# Patient Record
Sex: FEMALE | Race: OTHER | URBAN - METROPOLITAN AREA
[De-identification: names, ages, dates, MRNs, and addresses within clinical notes are randomized per-mention and may not be internally consistent; named-entity substitution may affect disease eponyms.]

---

## 2022-09-19 ENCOUNTER — HOSPITAL ENCOUNTER (EMERGENCY)
Age: 47
Discharge: HOME | End: 2022-09-19

## 2022-09-19 VITALS
HEART RATE: 104 BPM | TEMPERATURE: 98.7 F | DIASTOLIC BLOOD PRESSURE: 96 MMHG | RESPIRATION RATE: 19 BRPM | OXYGEN SATURATION: 99 % | BODY MASS INDEX: 29.23 KG/M2 | SYSTOLIC BLOOD PRESSURE: 160 MMHG | HEIGHT: 63 IN | WEIGHT: 165 LBS

## 2022-09-19 DIAGNOSIS — Z23 NEED FOR TETANUS BOOSTER: ICD-10-CM

## 2022-09-19 DIAGNOSIS — S61.411A LACERATION OF RIGHT HAND WITHOUT FOREIGN BODY, INITIAL ENCOUNTER: Primary | ICD-10-CM

## 2022-09-19 PROCEDURE — 90714 TD VACC NO PRESV 7 YRS+ IM: CPT | Performed by: PHYSICIAN ASSISTANT

## 2022-09-19 PROCEDURE — 99284 EMERGENCY DEPT VISIT MOD MDM: CPT

## 2022-09-19 PROCEDURE — 90471 IMMUNIZATION ADMIN: CPT | Performed by: PHYSICIAN ASSISTANT

## 2022-09-19 PROCEDURE — 6360000002 HC RX W HCPCS: Performed by: PHYSICIAN ASSISTANT

## 2022-09-19 PROCEDURE — 6370000000 HC RX 637 (ALT 250 FOR IP): Performed by: PHYSICIAN ASSISTANT

## 2022-09-19 PROCEDURE — 2500000003 HC RX 250 WO HCPCS: Performed by: PHYSICIAN ASSISTANT

## 2022-09-19 RX ORDER — NAPROXEN 500 MG/1
500 TABLET ORAL 2 TIMES DAILY
Qty: 14 TABLET | Refills: 0 | Status: SHIPPED | OUTPATIENT
Start: 2022-09-19 | End: 2022-09-26

## 2022-09-19 RX ORDER — CEPHALEXIN 500 MG/1
500 CAPSULE ORAL 4 TIMES DAILY
Qty: 28 CAPSULE | Refills: 0 | Status: SHIPPED | OUTPATIENT
Start: 2022-09-19 | End: 2022-10-07 | Stop reason: SDUPTHER

## 2022-09-19 RX ORDER — HYDROCODONE BITARTRATE AND ACETAMINOPHEN 5; 325 MG/1; MG/1
1 TABLET ORAL ONCE
Status: COMPLETED | OUTPATIENT
Start: 2022-09-19 | End: 2022-09-19

## 2022-09-19 RX ORDER — TETANUS AND DIPHTHERIA TOXOIDS ADSORBED 2; 2 [LF]/.5ML; [LF]/.5ML
0.5 INJECTION INTRAMUSCULAR ONCE
Status: COMPLETED | OUTPATIENT
Start: 2022-09-19 | End: 2022-09-19

## 2022-09-19 RX ORDER — HYDROCODONE BITARTRATE AND ACETAMINOPHEN 5; 325 MG/1; MG/1
1 TABLET ORAL EVERY 6 HOURS PRN
Qty: 12 TABLET | Refills: 0 | Status: SHIPPED | OUTPATIENT
Start: 2022-09-19 | End: 2022-09-22 | Stop reason: SDUPTHER

## 2022-09-19 RX ORDER — LIDOCAINE HYDROCHLORIDE 10 MG/ML
20 INJECTION, SOLUTION INFILTRATION; PERINEURAL ONCE
Status: COMPLETED | OUTPATIENT
Start: 2022-09-19 | End: 2022-09-19

## 2022-09-19 RX ADMIN — HYDROCODONE BITARTRATE AND ACETAMINOPHEN 1 TABLET: 5; 325 TABLET ORAL at 21:41

## 2022-09-19 RX ADMIN — TETANUS AND DIPHTHERIA TOXOIDS ADSORBED 0.5 ML: 2; 2 INJECTION INTRAMUSCULAR at 20:41

## 2022-09-19 RX ADMIN — LIDOCAINE HYDROCHLORIDE 20 ML: 10 INJECTION, SOLUTION INFILTRATION; PERINEURAL at 20:42

## 2022-09-19 ASSESSMENT — PAIN - FUNCTIONAL ASSESSMENT: PAIN_FUNCTIONAL_ASSESSMENT: 0-10

## 2022-09-19 ASSESSMENT — PAIN SCALES - GENERAL: PAINLEVEL_OUTOF10: 10

## 2022-09-20 NOTE — DISCHARGE INSTRUCTIONS
Rest hand  Ice is needed for swelling  Elevation for pain and swelling  Apply pressure if there is bleeding     Please keep wound covered with sterile dressing while at work or in public. Use Bacitracin or antibiotic ointment for only 2 days after the initial injury, otherwise, keep wound clean and dry. Don't soak wound in water. Do not use Peroxide or Alcohol to clean wound. Just use warm soap and water   Return to ED for wound check if wound becomes more painful or begins to drain yellow, thick discharge. Return with any increase in pain or swelling, inability to move extremity, or development of red streaking or fever.

## 2022-09-20 NOTE — ED PROVIDER NOTES
[facility]     Department of Emergency Medicine   ED  Provider Note  Admit Date/RoomTime: 9/19/2022  8:19 PM  ED Room: [facility]     Chief Complaint   Laceration (Right hand laceration after a fall)    History of Present Illness      Doerne Lazar is a 42 y.o. old female presenting to the emergency department for a laceration to the right hand. Patient states she was cooking and slipped on a wet floor patient states she hit her hand off of \"metal.\"  She is unsure of her last tetanus shot and will be updated at today's visit. Patient has a large C-shaped laceration to right palm with mild active bleeding. The bleeding is controlled with elevation and pressure. She has full range of motion of all fingers and wrist of affected extremity. She denies hitting her head or having any loss of consciousness. Patient denies any other complaint or concern at this ED visit. She does not take anticoagulation. She is alert and oriented x3 and in no apparent distress at this exam.  Patient is nontoxic-appearing. The patients tetanus status is unknown. ROS   Pertinent positives and negatives are stated within HPI, all other systems reviewed and are negative. Past Medical History:  has no past medical history on file. Past Surgical History:  has no past surgical history on file. Social History:  reports that she has never smoked. She has never used smokeless tobacco. She reports that she does not drink alcohol and does not use drugs. Family History: family history is not on file. Allergies: Patient has no known allergies. Allergies reviewed with patient     Physical Exam   VS:  BP (!) 160/96   Pulse (!) 104   Temp 98.7 °F (37.1 °C)   Resp 19   Ht 5' 3\" (1.6 m)   Wt 165 lb (74.8 kg)   LMP  (LMP Unknown)   SpO2 99%   BMI 29.23 kg/m²      Oxygen Saturation Interpretation: Normal.    Constitutional:  Alert and oriented x4, development consistent with age. HEENT:  NC/NT.   Airway and counseling regarding the diagnosis and prognosis. Questions are answered at this time and they are agreeable with the plan. Patient was educated on signs of infection and wound care. Patient was advised to return to ED if signs of infection or fever develop. Patient understands they must follow-up with PCP for suture removal and/or wound check. Patient remained nontoxic, afebrile, and A&O x3 during this ED visit. They agreed with plan of care, discharge, and importance of follow-up. Patient was in no distress at discharge. Vitals stable. All results reviewed with pt and all questions answered. Patient was educated on newly prescribed medication. Patient educated on wound care. Patient was neurovascularly intact at discharge. She had strong radial pulse. Full range of motion of all fingers and wrist of affected extremity. There is no bleeding at discharge. She was educated on bleeding control and signs symptoms that would require her emergent return to ED. Assessment     1. Laceration of right hand without foreign body, initial encounter    2. Need for tetanus booster      Plan   Discharge to home  Patient condition is good    New Medications     New Prescriptions    CEPHALEXIN (KEFLEX) 500 MG CAPSULE    Take 1 capsule by mouth 4 times daily for 7 days    HYDROCODONE-ACETAMINOPHEN (NORCO) 5-325 MG PER TABLET    Take 1 tablet by mouth every 6 hours as needed for Pain for up to 3 days. NAPROXEN (NAPROSYN) 500 MG TABLET    Take 1 tablet by mouth 2 times daily for 7 days       Electronically signed by Kurt Fair PA-C   DD: 9/19/22  **This report was transcribed using voice recognition software. Every effort was made to ensure accuracy; however, inadvertent computerized transcription errors may be present.     END OF ED PROVIDER NOTE        Kurt Fair PA-C  09/19/22 [id number]

## 2022-09-20 NOTE — PROGRESS NOTES
Left voicemail for patient with  Ipad that Dr. Vola Brittle would like to see her in the office this week, office phone number provided to call and schedule.

## 2022-09-21 ENCOUNTER — TELEPHONE (OUTPATIENT)
Dept: ORTHOPEDIC SURGERY | Age: 47
End: 2022-09-21

## 2022-09-21 DIAGNOSIS — S61.411A LACERATION OF RIGHT HAND WITHOUT FOREIGN BODY, INITIAL ENCOUNTER: Primary | ICD-10-CM

## 2022-09-22 ENCOUNTER — OFFICE VISIT (OUTPATIENT)
Dept: ORTHOPEDIC SURGERY | Age: 47
End: 2022-09-22

## 2022-09-22 VITALS — BODY MASS INDEX: 29.23 KG/M2 | RESPIRATION RATE: 20 BRPM | WEIGHT: 165 LBS | HEIGHT: 63 IN

## 2022-09-22 DIAGNOSIS — S61.411A LACERATION OF RIGHT HAND WITHOUT FOREIGN BODY, INITIAL ENCOUNTER: ICD-10-CM

## 2022-09-22 PROCEDURE — 99203 OFFICE O/P NEW LOW 30 MIN: CPT | Performed by: ORTHOPAEDIC SURGERY

## 2022-09-22 RX ORDER — BACITRACIN ZINC AND POLYMYXIN B SULFATE 500; 1000 [USP'U]/G; [USP'U]/G
OINTMENT TOPICAL DAILY
Qty: 15 G | Refills: 1 | Status: SHIPPED | OUTPATIENT
Start: 2022-09-22 | End: 2022-09-29

## 2022-09-22 RX ORDER — HYDROCODONE BITARTRATE AND ACETAMINOPHEN 5; 325 MG/1; MG/1
1 TABLET ORAL EVERY 6 HOURS PRN
Qty: 28 TABLET | Refills: 0 | Status: SHIPPED | OUTPATIENT
Start: 2022-09-22 | End: 2022-09-29

## 2022-09-22 NOTE — PROGRESS NOTES
Department of Orthopedic Surgery  History and Physical      CHIEF COMPLAINT: Right hand laceration    HISTORY OF PRESENT ILLNESS:                The patient is a 42 y.o. female who presents with laceration to right hand. Patient is Welsh-speaking and  was used for the entirety of the visit. Right-hand-dominant injury occurred 9/19/2022 patient was at home when she fell and caught her hand on her refrigerator and cut the hypothenar eminence. She went to the emergency department where the wound was irrigated, she was given a tetanus shot and the wound was closed. Told to follow-up. .  Denies any numbness, tingling, paresthesias. Denies any weakness in the hand. States pain is controlled. States no motor dysfunction. No orthopedic complaints this time. Past Medical History:    History reviewed. No pertinent past medical history. Past Surgical History:    History reviewed. No pertinent surgical history. Current Medications:   No current facility-administered medications for this visit. Allergies:  Patient has no known allergies. Social History:   TOBACCO:   reports that she has never smoked. She has never used smokeless tobacco.  ETOH:   reports no history of alcohol use. DRUGS:   reports no history of drug use. ACTIVITIES OF DAILY LIVING:    OCCUPATION:    Family History:   History reviewed. No pertinent family history.     REVIEW OF SYSTEMS:  CONSTITUTIONAL:  negative  HEENT:  negative  RESPIRATORY:  negative  CARDIOVASCULAR:  negative  GASTROINTESTINAL:  negative  INTEGUMENT/BREAST:  negative  HEMATOLOGIC/LYMPHATIC:  negative  ALLERGIC/IMMUNOLOGIC:  negative  ENDOCRINE:  negative  MUSCULOSKELETAL:  positive for  pain  NEUROLOGICAL:  negative for numbness and tingling    PHYSICAL EXAM:    VITALS:  Resp 20   Ht 5' 3\" (1.6 m)   Wt 165 lb (74.8 kg)   LMP  (LMP Unknown)   BMI 29.23 kg/m²   CONSTITUTIONAL:  awake, alert, cooperative, no apparent distress, and appears stated age  EYES: Lids and lashes normal, pupils equal, round and reactive to light, extra ocular muscles intact, sclera clear, conjunctiva normal  ENT:  Normocephalic, without obvious abnormality, atraumatic, sinuses nontender on palpation, external ears without lesions, oral pharynx with moist mucus membranes, tonsils without erythema or exudates, gums normal and good dentition. NECK:  Supple, symmetrical, trachea midline, no adenopathy, thyroid symmetric, not enlarged and no tenderness, skin normal  LUNGS:  CTA  CARDIOVASCULAR:  2+ radial pulses, extremities warm and well perfused  ABDOMEN:   NTTP  CHEST:  Atraumatic   GENITAL/URINARY:  deferred  NEUROLOGIC:  Awake, alert, oriented to name, place and time. Cranial nerves II-XII are grossly intact. Motor is 5 out of 5 bilaterally. Sensory is intact.  gait is normal.  MUSCULOSKELETAL:    Right upper extremity   J-shaped laceration over the hypothenar eminence roughly 4 cm long, at the proximal portion of the wound edges are black and dusky, distally it appears well-healing there is no evidence of active infection  Positive TTP about the laceration otherwise negative  DIP, PIP, MCP flexion intact  Comparments soft and compressible  +AIN/PIN/Ulnar/Median/Radial nerve function intact grossly  +2/4 Radial pulse, Cap refill <2sec  Distal sensation grossly intact to C4-T1 dermatomes, sensation tact light touch on the radial and ulnar side of all digits       DATA:    CBC: No results found for: WBC, RBC, HGB, HCT, MCV, MCH, MCHC, RDW, PLT, MPV  PT/INR:  No results found for: PROTIME, INR    Radiology Review:  Xray: x-rays of the right hand were obtained today in the office and reviewed with the patient. 3 views: AP, oblique, lateral: demonstrate acute fractures or dislocations  Impression: No acute fractures dislocations    IMPRESSION:  Laceration right hypothenar eminence    PLAN:  Discussed treatment diagnosis patient. No neurovascular deficit noted all tendons intact.   Only area of concern is the proximal portion of the laceration with dusky and black edges. We will start local wound care. Advised patient on this and will have her back in 7 to 10 days to take out sutures and assess the wound. All questions concerns answered     I have seen and evaluated the patient and agree with the above assessment and plan on today's visit. I have performed the key components of the history and physical examination with significant findings of laceration to the hypothenar eminence without nerve tendon or vascular injury. She does have a little bit of duskiness around her edges. Discussed local wound care. . I concur with the findings and plan as documented.     Dex Aguiar MD  9/22/2022

## 2022-09-23 ENCOUNTER — OFFICE VISIT (OUTPATIENT)
Dept: INTERNAL MEDICINE | Age: 47
End: 2022-09-23

## 2022-09-23 VITALS
DIASTOLIC BLOOD PRESSURE: 76 MMHG | HEIGHT: 64 IN | WEIGHT: 156.5 LBS | RESPIRATION RATE: 18 BRPM | BODY MASS INDEX: 26.72 KG/M2 | OXYGEN SATURATION: 97 % | HEART RATE: 93 BPM | TEMPERATURE: 98.1 F | SYSTOLIC BLOOD PRESSURE: 126 MMHG

## 2022-09-23 DIAGNOSIS — Z23 NEED FOR INFLUENZA VACCINATION: ICD-10-CM

## 2022-09-23 DIAGNOSIS — Z12.31 ENCOUNTER FOR SCREENING MAMMOGRAM FOR MALIGNANT NEOPLASM OF BREAST: ICD-10-CM

## 2022-09-23 DIAGNOSIS — N92.6 IRREGULAR PERIODS/MENSTRUAL CYCLES: ICD-10-CM

## 2022-09-23 DIAGNOSIS — Z12.11 COLON CANCER SCREENING: ICD-10-CM

## 2022-09-23 DIAGNOSIS — R42 LIGHTHEADED: Primary | ICD-10-CM

## 2022-09-23 DIAGNOSIS — R42 LIGHTHEADED: ICD-10-CM

## 2022-09-23 LAB
BASOPHILS ABSOLUTE: 0.06 E9/L (ref 0–0.2)
BASOPHILS RELATIVE PERCENT: 0.6 % (ref 0–2)
EOSINOPHILS ABSOLUTE: 0.12 E9/L (ref 0.05–0.5)
EOSINOPHILS RELATIVE PERCENT: 1.2 % (ref 0–6)
HCT VFR BLD CALC: 41.7 % (ref 34–48)
HEMOGLOBIN: 13.3 G/DL (ref 11.5–15.5)
IMMATURE GRANULOCYTES #: 0.03 E9/L
IMMATURE GRANULOCYTES %: 0.3 % (ref 0–5)
LYMPHOCYTES ABSOLUTE: 2.17 E9/L (ref 1.5–4)
LYMPHOCYTES RELATIVE PERCENT: 21.1 % (ref 20–42)
MCH RBC QN AUTO: 29.6 PG (ref 26–35)
MCHC RBC AUTO-ENTMCNC: 31.9 % (ref 32–34.5)
MCV RBC AUTO: 92.7 FL (ref 80–99.9)
MONOCYTES ABSOLUTE: 0.53 E9/L (ref 0.1–0.95)
MONOCYTES RELATIVE PERCENT: 5.2 % (ref 2–12)
NEUTROPHILS ABSOLUTE: 7.38 E9/L (ref 1.8–7.3)
NEUTROPHILS RELATIVE PERCENT: 71.6 % (ref 43–80)
PDW BLD-RTO: 13.2 FL (ref 11.5–15)
PLATELET # BLD: 382 E9/L (ref 130–450)
PMV BLD AUTO: 9.9 FL (ref 7–12)
RBC # BLD: 4.5 E12/L (ref 3.5–5.5)
WBC # BLD: 10.3 E9/L (ref 4.5–11.5)

## 2022-09-23 PROCEDURE — 99204 OFFICE O/P NEW MOD 45 MIN: CPT | Performed by: STUDENT IN AN ORGANIZED HEALTH CARE EDUCATION/TRAINING PROGRAM

## 2022-09-23 SDOH — ECONOMIC STABILITY: TRANSPORTATION INSECURITY
IN THE PAST 12 MONTHS, HAS LACK OF TRANSPORTATION KEPT YOU FROM MEETINGS, WORK, OR FROM GETTING THINGS NEEDED FOR DAILY LIVING?: YES

## 2022-09-23 SDOH — ECONOMIC STABILITY: INCOME INSECURITY: IN THE LAST 12 MONTHS, WAS THERE A TIME WHEN YOU WERE NOT ABLE TO PAY THE MORTGAGE OR RENT ON TIME?: NO

## 2022-09-23 SDOH — ECONOMIC STABILITY: FOOD INSECURITY: WITHIN THE PAST 12 MONTHS, YOU WORRIED THAT YOUR FOOD WOULD RUN OUT BEFORE YOU GOT MONEY TO BUY MORE.: NEVER TRUE

## 2022-09-23 SDOH — ECONOMIC STABILITY: HOUSING INSECURITY: IN THE LAST 12 MONTHS, HOW MANY PLACES HAVE YOU LIVED?: 2

## 2022-09-23 SDOH — ECONOMIC STABILITY: FOOD INSECURITY: WITHIN THE PAST 12 MONTHS, THE FOOD YOU BOUGHT JUST DIDN'T LAST AND YOU DIDN'T HAVE MONEY TO GET MORE.: NEVER TRUE

## 2022-09-23 SDOH — ECONOMIC STABILITY: TRANSPORTATION INSECURITY
IN THE PAST 12 MONTHS, HAS THE LACK OF TRANSPORTATION KEPT YOU FROM MEDICAL APPOINTMENTS OR FROM GETTING MEDICATIONS?: YES

## 2022-09-23 SDOH — ECONOMIC STABILITY: HOUSING INSECURITY
IN THE LAST 12 MONTHS, WAS THERE A TIME WHEN YOU DID NOT HAVE A STEADY PLACE TO SLEEP OR SLEPT IN A SHELTER (INCLUDING NOW)?: NO

## 2022-09-23 ASSESSMENT — ENCOUNTER SYMPTOMS
NAUSEA: 0
ABDOMINAL PAIN: 0
SINUS PAIN: 0
COUGH: 0
DIARRHEA: 0
BACK PAIN: 0
BLOOD IN STOOL: 0
SHORTNESS OF BREATH: 0
VOMITING: 0
CONSTIPATION: 0

## 2022-09-23 ASSESSMENT — PATIENT HEALTH QUESTIONNAIRE - PHQ9
1. LITTLE INTEREST OR PLEASURE IN DOING THINGS: 0
SUM OF ALL RESPONSES TO PHQ QUESTIONS 1-9: 1
SUM OF ALL RESPONSES TO PHQ QUESTIONS 1-9: 1
2. FEELING DOWN, DEPRESSED OR HOPELESS: 1
SUM OF ALL RESPONSES TO PHQ QUESTIONS 1-9: 1
SUM OF ALL RESPONSES TO PHQ QUESTIONS 1-9: 1
SUM OF ALL RESPONSES TO PHQ9 QUESTIONS 1 & 2: 1

## 2022-09-23 ASSESSMENT — LIFESTYLE VARIABLES
HOW MANY STANDARD DRINKS CONTAINING ALCOHOL DO YOU HAVE ON A TYPICAL DAY: 1 OR 2
HOW OFTEN DO YOU HAVE A DRINK CONTAINING ALCOHOL: MONTHLY OR LESS

## 2022-09-23 ASSESSMENT — SOCIAL DETERMINANTS OF HEALTH (SDOH): HOW HARD IS IT FOR YOU TO PAY FOR THE VERY BASICS LIKE FOOD, HOUSING, MEDICAL CARE, AND HEATING?: HARD

## 2022-09-23 NOTE — PROGRESS NOTES
[facility]  Internal Medicine Clinic    Attending Physician Statement:  Smiley Guerrero M.D., F.A.C.P. I have seen/discussed the case, including pertinent history and exam findings with the resident. I agree with the assessment, plan and orders as documented by the resident. Patient is seen for ER fu visit today. Last ER/office notes reviewed, relative labs and imaging. Establish new patient  -- seen ER  Hand injury- see notes  Concerns about CA  +family  Recent pap/pelvic  Needs mammo (financial aid done)  No income   +language/cultural +social determinants  Colonscopy also +labs  35min  Remainder of medical problems as per resident note.

## 2022-09-23 NOTE — PROGRESS NOTES
boy, 9 yo girl. Has trialed OCP in past but had adverse reaction with anxiety that resolved with stopping the medication. Reports having pap smear yearly in [facility] with normal result in early 2022. Patient does not taking chronic medications. She was prescribed norco and naproxen from the ED for her hand laceration but did not have prescriptions filled as the pain was not too bothersome. She has been taking iron gummies at home as she had \"a lot of blood loss\" from the laceration. She also endorsed dizziness and weakness after the injury so she was worried this was from anemia. We discussed checking a CBC to confirm anemia and hold off on iron gummies now to avoid iron overload and constipation as possible sequelae. She is also taking \"X-RAY\" which is glucosamine, chondroitin, MSM, vitamin D multivitamin. I see no problem with her continuing this supplement. Fhx: Mother had colon cancer, passed. Sister has breast cancer and a tumor in her uterus, alive. Maternal aunt had uterine cancer, passed. Maternal grandmother had colon cancer, passed. Review of Systems   Constitutional:  Negative for chills, diaphoresis and fever. HENT:  Negative for congestion, hearing loss, mouth sores and sinus pain. Respiratory:  Negative for cough and shortness of breath. Cardiovascular:  Negative for chest pain, palpitations and leg swelling. Gastrointestinal:  Negative for abdominal pain, blood in stool, constipation, diarrhea, nausea and vomiting. Genitourinary:  Positive for menstrual problem. Negative for dysuria, frequency, hematuria and urgency. Musculoskeletal:  Negative for arthralgias, back pain and neck pain. Neurological:  Negative for dizziness, seizures, weakness and light-headedness.      Outpatient Medications Marked as Taking for the 9/23/22 encounter (Office Visit) with Laure Shaikh MD   Medication Sig Dispense Refill    cephALEXin (KEFLEX) 500 MG capsule Take 1 capsule by mouth 4 times daily for 7 days 28 capsule 0     OBJECTIVE:    VS:   /76 (Site: Left Upper Arm, Position: Sitting, Cuff Size: Large Adult)   Pulse 93   Temp 98.1 °F (36.7 °C) (Temporal)   Resp 18   Ht 5' 3.78\" (1.62 m)   Wt 156 lb 8 oz (71 kg)   LMP  (LMP Unknown) Comment: previously saw GYN in [facility] around Feb/March 2022  SpO2 97% Comment: room air  BMI 27.05 kg/m²     EXAM:  Physical Exam  Constitutional:       Appearance: Normal appearance. She is not ill-appearing or diaphoretic. HENT:      Head: Normocephalic and atraumatic. Cardiovascular:      Rate and Rhythm: Normal rate and regular rhythm. Heart sounds: No murmur heard. No friction rub. No gallop. Pulmonary:      Effort: No respiratory distress. Breath sounds: No stridor. No wheezing, rhonchi or rales. Chest:   Breasts:     Breasts are symmetrical.      Right: Normal. No swelling, bleeding, inverted nipple, mass, nipple discharge, skin change, tenderness, axillary adenopathy or supraclavicular adenopathy. Left: Normal. No swelling, bleeding, mass, nipple discharge, skin change, tenderness, axillary adenopathy or supraclavicular adenopathy. Abdominal:      General: Bowel sounds are normal. There is no distension. Palpations: Abdomen is soft. There is no mass. Tenderness: There is no abdominal tenderness. There is no guarding or rebound. Hernia: No hernia is present. Musculoskeletal:         General: No swelling or tenderness. Right lower leg: No edema. Left lower leg: No edema. Lymphadenopathy:      Upper Body:      Right upper body: No supraclavicular, axillary or pectoral adenopathy. Left upper body: No supraclavicular, axillary or pectoral adenopathy. Skin:     Coloration: Skin is not jaundiced. Findings: No erythema or rash. Neurological:      Mental Status: She is alert and oriented to person, place, and time.        ASSESSMENT/PLAN:  I have reviewed all pertinent PMH, PSH, FH, SH, medications and allergies and updated history as appropriate. Ash Nguyen was seen today for new patient and breast pain. Diagnoses and all orders for this visit:    Lightheaded  -     CBC with Auto Differential; Future    Need for influenza vaccination  -     Influenza, AFLURIA, (age 1 y+), IM, Preservative Free, 0.5 mL    Irregular periods/menstrual cycles  -     [address], Oscar Parkinson DO, OB/GYN, [address] [facility] Christine Soares MD, General Surgery, [facility]    Encounter for screening mammogram for malignant neoplasm of breast  -     RUBEN DIGITAL DIAGNOSTIC BILATERAL PER PROTOCOL;  Future      RTC: 3 months for routine follow up      I have reviewed my findings and recommendations with Lavon Sim and Dr Crissy Ryan MD PGY-2  9/23/2022 1:49 PM

## 2022-09-23 NOTE — PROGRESS NOTES
1 tubes of blood drawn from left arm   (0 gold, 0 green, 1 lavender, 0 other). Sent via tube system.

## 2022-09-23 NOTE — PATIENT INSTRUCTIONS
[place]  Referrals   Para doctora gynecologica, [facility] [address] doctor de [place] para la colonoscopia, Doctor Sherree Schaumann      Por favor, vuelva a esta clínica en 3 meses . Llámenos si jose síntomas empeoran ó no mejoran. Please follow up in 3 months with our clinic. Please call if your symptoms worsen or fail to improve.

## 2022-09-23 NOTE — PROGRESS NOTES
[facility]  Internal Medicine Residency Program  [facility] Note      SUBJECTIVE:  CC: had concerns including New Patient and Breast Pain (Pt states left breast pain about 2 days ago. Pt describes pain as stabbing. Pt states no mammogram.   Pt denies any lumps in breast and does self breast exam.  ). HPI:  Zaynab Hanna is a 42 y. o.female presenting to [facility] for new patient visit. Our clinic  Nevin Naranjo provided interpretation today in-person. Seen at ED 9/19/22 for laceration to the right hand after slipping on wet floor and hitting her hand off of a metal object. Tetanus booster was given at ED visit and patient was sutured with 19 stitches. Pt had follow up with orthopedics yesterday and was told the wound looked clean. Note from ortho yesterday states they will have patient return in 7-10 days for suture removal. ROM documented as good, normal pulse and cap refill, grossly intact sensation of C4-T1 dermatomes from ortho note. Patient endorses new-onset left breast pain. She describes it as stabbing pain within the breast that is intermittent with one episode 2 days ago and another episode yesterday. Denies breast redness or swelling, denies nipple discharge. She performs self breast examinations regularly and has not noticed any new lumps. Due to family history of breast and colon cancer, patient would like to pursue testing with mammography and colonoscopy. She had requested mammography last year in [facility], but was declined due to being <39years old. LMP: Patient states she has had irregular menstrual cycles since giving birth to her 6year old daughter. She states that a doctor started her on Depo-Provera injections in [facility], but is unsure when she started. Her last injection was May 2022 in [facility], and knows she was due for another one in August but does not have an OBGYN yet.  She had some breakthrough bleeding around August 10th.    OBGYN hx: Has 2 children, 7 yo boy, 5 yo girl. Has trialed OCP in past but had adverse reaction with anxiety that resolved with stopping the medication. Reports having pap smear yearly in [facility] with normal result in early 2022. Patient does not taking chronic medications. She was prescribed norco and naproxen from the ED for her hand laceration but did not have prescriptions filled as the pain was not too bothersome. She has been taking iron gummies at home as she had \"a lot of blood loss\" from the laceration. She also endorsed dizziness and weakness after the injury so she was worried this was from anemia. We discussed checking a CBC to confirm anemia and hold off on iron gummies now to avoid iron overload and constipation as possible sequelae. She is also taking \"X-RAY\" which is glucosamine, chondroitin, MSM, vitamin D multivitamin. I see no problem with her continuing this supplement. Fhx: Mother had colon cancer, passed. Sister has breast cancer and a tumor in her uterus, alive. Maternal aunt had uterine cancer, passed. Maternal grandmother had colon cancer, passed. Review of Systems   Constitutional:  Negative for chills, diaphoresis and fever. HENT:  Negative for congestion, hearing loss, mouth sores and sinus pain. Respiratory:  Negative for cough and shortness of breath. Cardiovascular:  Negative for chest pain, palpitations and leg swelling. Gastrointestinal:  Negative for abdominal pain, blood in stool, constipation, diarrhea, nausea and vomiting. Genitourinary:  Positive for menstrual problem. Negative for dysuria, frequency, hematuria and urgency. Musculoskeletal:  Negative for arthralgias, back pain and neck pain. Neurological:  Negative for dizziness, seizures, weakness and light-headedness.      Outpatient Medications Marked as Taking for the 9/23/22 encounter (Office Visit) with Beatris Steward MD   Medication Sig Dispense Refill    cephALEXin (KEFLEX) 500 MG capsule Take 1 capsule by mouth 4 times daily for 7 days 28 capsule 0     OBJECTIVE:    VS:   /76 (Site: Left Upper Arm, Position: Sitting, Cuff Size: Large Adult)   Pulse 93   Temp 98.1 °F (36.7 °C) (Temporal)   Resp 18   Ht 5' 3.78\" (1.62 m)   Wt 156 lb 8 oz (71 kg)   LMP  (LMP Unknown) Comment: previously saw GYN in [facility] around Feb/March 2022  SpO2 97% Comment: room air  BMI 27.05 kg/m²     EXAM:  Physical Exam  Constitutional:       Appearance: Normal appearance. She is not ill-appearing or diaphoretic. HENT:      Head: Normocephalic and atraumatic. Cardiovascular:      Rate and Rhythm: Normal rate and regular rhythm. Heart sounds: No murmur heard. No friction rub. No gallop. Pulmonary:      Effort: No respiratory distress. Breath sounds: No stridor. No wheezing, rhonchi or rales. Chest:   Breasts:     Breasts are symmetrical.      Right: Normal. No swelling, bleeding, inverted nipple, mass, nipple discharge, skin change, tenderness, axillary adenopathy or supraclavicular adenopathy. Left: Normal. No swelling, bleeding, mass, nipple discharge, skin change, tenderness, axillary adenopathy or supraclavicular adenopathy. Abdominal:      General: Bowel sounds are normal. There is no distension. Palpations: Abdomen is soft. There is no mass. Tenderness: There is no abdominal tenderness. There is no guarding or rebound. Hernia: No hernia is present. Musculoskeletal:         General: No swelling or tenderness. Right lower leg: No edema. Left lower leg: No edema. Lymphadenopathy:      Upper Body:      Right upper body: No supraclavicular, axillary or pectoral adenopathy. Left upper body: No supraclavicular, axillary or pectoral adenopathy. Skin:     Coloration: Skin is not jaundiced. Findings: No erythema or rash. Neurological:      Mental Status: She is alert and oriented to person, place, and time.        ASSESSMENT/PLAN:  I have reviewed all pertinent PMH, PSH, FH, SH, medications and allergies and updated history as appropriate. Edie Nguyen was seen today for new patient and breast pain. Diagnoses and all orders for this visit:    Lightheaded  -     CBC with Auto Differential; Future    Need for influenza vaccination  -     Influenza, AFLURIA, (age 1 y+), IM, Preservative Free, 0.5 mL    Irregular periods/menstrual cycles  -     [address], Iraida Puri DO, OB/GYN, [address] [facility] Cat Montano MD, General Surgery, [facility]    Encounter for screening mammogram for malignant neoplasm of breast  -     RUBEN DIGITAL DIAGNOSTIC BILATERAL PER PROTOCOL;  Future      RTC: 3 months for routine follow up      I have reviewed my findings and recommendations with Bob Abraham and Dr Shameka Ryan MD PGY-2  9/23/2022 1:50 PM

## 2022-10-05 DIAGNOSIS — Z80.3 FAMILY HISTORY OF BREAST CANCER IN FIRST DEGREE RELATIVE: ICD-10-CM

## 2022-10-05 DIAGNOSIS — N64.4 BREAST PAIN, LEFT: Primary | ICD-10-CM

## 2022-10-07 ENCOUNTER — OFFICE VISIT (OUTPATIENT)
Dept: ORTHOPEDIC SURGERY | Age: 47
End: 2022-10-07

## 2022-10-07 VITALS — BODY MASS INDEX: 29.23 KG/M2 | WEIGHT: 165 LBS | HEIGHT: 63 IN

## 2022-10-07 DIAGNOSIS — S61.411A LACERATION OF RIGHT HAND WITHOUT FOREIGN BODY, INITIAL ENCOUNTER: Primary | ICD-10-CM

## 2022-10-07 PROCEDURE — 99213 OFFICE O/P EST LOW 20 MIN: CPT | Performed by: PHYSICIAN ASSISTANT

## 2022-10-07 RX ORDER — CEPHALEXIN 500 MG/1
500 CAPSULE ORAL 4 TIMES DAILY
Qty: 28 CAPSULE | Refills: 0 | Status: SHIPPED | OUTPATIENT
Start: 2022-10-07 | End: 2022-10-14

## 2022-10-07 NOTE — PROGRESS NOTES
Department of Orthopedic Surgery  History and Physical      CHIEF COMPLAINT: Right hand laceration    HISTORY OF PRESENT ILLNESS:                The patient is a 42 y.o. female who presents with laceration to right hand. Right-hand-dominant injury occurred 9/19/2022 patient was at home when she fell and caught her hand on her refrigerator and cut the hypothenar eminence. She went to the emergency department where the wound was irrigated, she was given a tetanus shot and the wound was closed. Denies any numbness, tingling, paresthesias. Denies any weakness in the hand. States pain is controlled. States no motor dysfunction. No orthopedic complaints this time. Patient is now 18 days s/p right hand laceration. She reports she finished her antibiotics last week. She missed her appointment earlier this week. She has been doing daily dressing changes with antibiotic ointment. She reports very little drainage from her wound. Patient is Iranian-speaking and  was used for the entirety of the visit. Past Medical History:    History reviewed. No pertinent past medical history. Past Surgical History:    History reviewed. No pertinent surgical history. Current Medications:   No current facility-administered medications for this visit. Allergies:  Patient has no known allergies. Social History:   TOBACCO:   reports that she has never smoked. She has never used smokeless tobacco.  ETOH:   reports no history of alcohol use. DRUGS:   reports no history of drug use.   ACTIVITIES OF DAILY LIVING:    OCCUPATION:    Family History:       Problem Relation Age of Onset    Colon Cancer Mother     Breast Cancer Sister        REVIEW OF SYSTEMS:  CONSTITUTIONAL:  negative  HEENT:  negative  RESPIRATORY:  negative  CARDIOVASCULAR:  negative  GASTROINTESTINAL:  negative  INTEGUMENT/BREAST:  negative  HEMATOLOGIC/LYMPHATIC:  negative  ALLERGIC/IMMUNOLOGIC:  negative  ENDOCRINE:  negative  MUSCULOSKELETAL: positive for pain to right hand  NEUROLOGICAL:  negative for numbness and tingling    PHYSICAL EXAM:    VITALS:  Ht 5' 3\" (1.6 m)   Wt 165 lb (74.8 kg)   LMP  (LMP Unknown) Comment: previously saw GYN in [facility] around Feb/March 2022  BMI 29.23 kg/m²   CONSTITUTIONAL:  awake, alert, cooperative, no apparent distress, and appears stated age  EYES:  Lids and lashes normal, pupils equal, round and reactive to light, extra ocular muscles intact, sclera clear, conjunctiva normal  ENT:  Normocephalic, without obvious abnormality, atraumatic, sinuses nontender on palpation, external ears without lesions, oral pharynx with moist mucus membranes, tonsils without erythema or exudates, gums normal and good dentition. NECK:  Supple, symmetrical, trachea midline, no adenopathy, thyroid symmetric, not enlarged and no tenderness, skin normal  NEUROLOGIC:  Awake, alert, oriented to name, place and time. Cranial nerves II-XII are grossly intact. Motor is 5 out of 5 bilaterally. Sensory is intact.  gait is normal.  MUSCULOSKELETAL:    Right upper extremity   J-shaped laceration over the hypothenar eminence roughly 4 cm long  Epidermis debrided today. Underlying flap with brisk capillary refill distally. Proximally there is duskiness to the flap and maceration. Every other suture removed. With pressure on the flap there is scant yellowish drainage present. No surrounding erythema.   Positive TTP about the laceration otherwise negative  DIP, PIP, MCP flexion intact  Comparments soft and compressible  +AIN/PIN/Ulnar/Median/Radial nerve function intact grossly  +2/4 Radial pulse, Cap refill <2sec  Distal sensation grossly intact to C4-T1 dermatomes, sensation tact light touch on the radial and ulnar side of all digits       DATA:    CBC:   Lab Results   Component Value Date/Time    WBC 10.3 09/23/2022 10:59 AM    RBC 4.50 09/23/2022 10:59 AM    HGB 13.3 09/23/2022 10:59 AM    HCT 41.7 09/23/2022 10:59 AM    MCV 92.7 09/23/2022 10:59 AM    MCH 29.6 09/23/2022 10:59 AM    MCHC 31.9 09/23/2022 10:59 AM    RDW 13.2 09/23/2022 10:59 AM     09/23/2022 10:59 AM    MPV 9.9 09/23/2022 10:59 AM     PT/INR:  No results found for: PROTIME, INR    IMPRESSION:  Laceration right hypothenar eminence    PLAN:  Discussed findings with the patient. Patient to continue daily dressing changes with saline, antibiotic ointment, and nonadherent dressing. Keflex provided to the patient. Patient to follow up Monday for a wound check and remaining suture removal and evaluation of the flap. All questions answered.

## 2022-10-10 ENCOUNTER — OFFICE VISIT (OUTPATIENT)
Dept: ORTHOPEDIC SURGERY | Age: 47
End: 2022-10-10

## 2022-10-10 VITALS — HEIGHT: 60 IN | BODY MASS INDEX: 32.39 KG/M2 | WEIGHT: 165 LBS

## 2022-10-10 DIAGNOSIS — S61.411A LACERATION OF RIGHT HAND WITHOUT FOREIGN BODY, INITIAL ENCOUNTER: Primary | ICD-10-CM

## 2022-10-10 PROCEDURE — 99212 OFFICE O/P EST SF 10 MIN: CPT | Performed by: ORTHOPAEDIC SURGERY

## 2022-10-10 NOTE — PROGRESS NOTES
Department of Orthopedic Surgery  History and Physical      CHIEF COMPLAINT: Right hand laceration    HISTORY OF PRESENT ILLNESS:                The patient is a 42 y.o. female who presents with laceration to right hand. Right-hand-dominant injury occurred 9/19/2022 patient was at home when she fell and caught her hand on her refrigerator and cut the hypothenar eminence. She went to the emergency department where the wound was irrigated, she was given a tetanus shot and the wound was closed. Denies any numbness, tingling, paresthesias. Denies any weakness in the hand. States pain is controlled. States no motor dysfunction. No orthopedic complaints this time. Patient is now 21 days s/p right hand laceration. He is on antibiotics. Denies any drainage. Denies any fever or chills. Has been doing daily dressing changes with antibiotic ointment. She reports she finished her antibiotics last week. She missed her appointment earlier this week. She has been doing daily dressing changes with antibiotic ointment. Patient is Tunisian-speaking and  was used for the entirety of the visit. Past Medical History:    No past medical history on file. Past Surgical History:    No past surgical history on file. Current Medications:   No current facility-administered medications for this visit. Allergies:  Patient has no known allergies. Social History:   TOBACCO:   reports that she has never smoked. She has never used smokeless tobacco.  ETOH:   reports no history of alcohol use. DRUGS:   reports no history of drug use.   ACTIVITIES OF DAILY LIVING:    OCCUPATION:    Family History:       Problem Relation Age of Onset    Colon Cancer Mother     Breast Cancer Sister        REVIEW OF SYSTEMS:  CONSTITUTIONAL:  negative  HEENT:  negative  RESPIRATORY:  negative  CARDIOVASCULAR:  negative  GASTROINTESTINAL:  negative  INTEGUMENT/BREAST:  negative  HEMATOLOGIC/LYMPHATIC:  negative  ALLERGIC/IMMUNOLOGIC: negative  ENDOCRINE:  negative  MUSCULOSKELETAL:  positive for pain to right hand  NEUROLOGICAL:  negative for numbness and tingling    PHYSICAL EXAM:    VITALS:  Ht 5' (1.524 m)   Wt 165 lb (74.8 kg)   LMP  (LMP Unknown) Comment: previously saw GYN in [facility] around Feb/March 2022  BMI 32.22 kg/m²   CONSTITUTIONAL:  awake, alert, cooperative, no apparent distress, and appears stated age  EYES:  Lids and lashes normal, pupils equal, round and reactive to light, extra ocular muscles intact, sclera clear, conjunctiva normal  ENT:  Normocephalic, without obvious abnormality, atraumatic, sinuses nontender on palpation, external ears without lesions, oral pharynx with moist mucus membranes, tonsils without erythema or exudates, gums normal and good dentition. NECK:  Supple, symmetrical, trachea midline, no adenopathy, thyroid symmetric, not enlarged and no tenderness, skin normal  NEUROLOGIC:  Awake, alert, oriented to name, place and time. Cranial nerves II-XII are grossly intact. Motor is 5 out of 5 bilaterally. Sensory is intact.  gait is normal.  MUSCULOSKELETAL:    Right upper extremity   J-shaped laceration over the hypothenar eminence roughly 4 cm long  Proximally there is some partial thickness injury with minimal flap necrosis. Distally the incision is well healing. No surrounding erythema or signs of infection.   Positive TTP about the laceration otherwise negative  DIP, PIP, MCP flexion intact  Comparments soft and compressible  +AIN/PIN/Ulnar/Median/Radial nerve function intact grossly  +2/4 Radial pulse, Cap refill <2sec  Distal sensation grossly intact to C4-T1 dermatomes, sensation tact light touch on the radial and ulnar side of all digits       DATA:    CBC:   Lab Results   Component Value Date/Time    WBC 10.3 09/23/2022 10:59 AM    RBC 4.50 09/23/2022 10:59 AM    HGB 13.3 09/23/2022 10:59 AM    HCT 41.7 09/23/2022 10:59 AM    MCV 92.7 09/23/2022 10:59 AM    MCH 29.6 09/23/2022 10:59 AM    MCHC 31.9 09/23/2022 10:59 AM    RDW 13.2 09/23/2022 10:59 AM     09/23/2022 10:59 AM    MPV 9.9 09/23/2022 10:59 AM     PT/INR:  No results found for: PROTIME, INR    IMPRESSION:  Laceration right hypothenar eminence    PLAN:  Discussed findings with the patient. Patient to continue daily dressing changes with saline, antibiotic ointment, and nonadherent dressing. Can discontinue antibiotics. We did discuss with patient that she is allowed to get the incision wet in the shower but no dishes, pools, hot tubs, etc.  Call with any questions or concerns. We did discuss with her this becomes infected again we will put her back on antibiotics and should call the office. I have seen and evaluated the patient and agree with the above assessment and plan on today's visit. I have performed the key components of the history and physical examination with significant findings of healing wound right hand. Local wound care explained and demonstrated. Discussed possible skin graft if needed. I concur with the findings and plan as documented.     Tessa Keating MD  10/10/2022

## 2022-11-17 ENCOUNTER — TELEPHONE (OUTPATIENT)
Dept: SURGERY | Age: 47
End: 2022-11-17

## 2022-11-17 DIAGNOSIS — N64.4 BREAST PAIN: Primary | ICD-10-CM

## 2022-11-17 DIAGNOSIS — Z80.3 FAMILY HISTORY OF MALIGNANT NEOPLASM OF BREAST: ICD-10-CM

## 2022-11-17 NOTE — TELEPHONE ENCOUNTER
Using an , MA contacted pt to reschedule colonoscopy consult origionaly scheduled 11/14. Pt wished to keep appt in [place]. MA informed pt that the new schedule for providers was not in our system yet and that they would receive return call once schedule is received.  Pt agreed     Electronically signed by Ghada Webster MA on 11/17/2022 at 1:33 PM

## 2022-11-30 ENCOUNTER — OFFICE VISIT (OUTPATIENT)
Dept: OBGYN | Age: 47
End: 2022-11-30

## 2022-11-30 VITALS
DIASTOLIC BLOOD PRESSURE: 76 MMHG | HEART RATE: 71 BPM | WEIGHT: 157.6 LBS | BODY MASS INDEX: 30.78 KG/M2 | SYSTOLIC BLOOD PRESSURE: 133 MMHG

## 2022-11-30 DIAGNOSIS — Z12.4 SCREENING FOR CERVICAL CANCER: ICD-10-CM

## 2022-11-30 DIAGNOSIS — Z80.3 FAMILY HISTORY OF BREAST CANCER IN FIRST DEGREE RELATIVE: ICD-10-CM

## 2022-11-30 DIAGNOSIS — R10.2 PELVIC PAIN: ICD-10-CM

## 2022-11-30 DIAGNOSIS — N91.4 SECONDARY OLIGOMENORRHEA: Primary | ICD-10-CM

## 2022-11-30 LAB
CONTROL: NORMAL
PREGNANCY TEST URINE, POC: NEGATIVE

## 2022-11-30 PROCEDURE — 99203 OFFICE O/P NEW LOW 30 MIN: CPT | Performed by: OBSTETRICS & GYNECOLOGY

## 2022-11-30 PROCEDURE — 81025 URINE PREGNANCY TEST: CPT | Performed by: OBSTETRICS & GYNECOLOGY

## 2022-11-30 PROCEDURE — 99204 OFFICE O/P NEW MOD 45 MIN: CPT | Performed by: OBSTETRICS & GYNECOLOGY

## 2022-11-30 RX ORDER — MEDROXYPROGESTERONE ACETATE 150 MG/ML
150 INJECTION, SUSPENSION INTRAMUSCULAR ONCE
Status: COMPLETED | OUTPATIENT
Start: 2022-11-30 | End: 2022-11-30

## 2022-11-30 RX ADMIN — MEDROXYPROGESTERONE ACETATE 150 MG: 150 INJECTION, SUSPENSION INTRAMUSCULAR at 14:30

## 2022-11-30 NOTE — PROGRESS NOTES
Pt seen with . HISTORY OF PRESENT ILLNESS:    41 y.o. female   presents with complaint of irregular menses. Pt was previously on Depo-provera, last injection was 2022. Since then she has only had one period that was in August. Requests depo for cycle control. Pt c/o cramping despite not having periods. Pt had a pap in February in [facility]. Past Medical History: No past medical history on file. OB History    Para Term  AB Living   3 2     1     SAB IAB Ectopic Molar Multiple Live Births                    # Outcome Date GA Lbr Tito/2nd Weight Sex Delivery Anes PTL Lv   3 Para      CS-LTranv      2 Para      CS-LTranv      1 AB                  Past Surgical History: No past surgical history on file. Allergies: Patient has no known allergies. Medications:   Current Outpatient Medications   Medication Sig Dispense Refill    naproxen (NAPROSYN) 500 MG tablet Take 1 tablet by mouth 2 times daily for 7 days (Patient not taking: Reported on 2022) 14 tablet 0     No current facility-administered medications for this visit. Social History:   Social History     Tobacco Use    Smoking status: Never    Smokeless tobacco: Never   Substance Use Topics    Alcohol use: Never        Family History:   Family History   Problem Relation Age of Onset    Colon Cancer Mother 71    Breast Cancer Sister 50       REVIEW OF SYSTEMS:    Constitutional: negative  HEENT: negative  Breast: negative  Respiratory: negative  Cardiovascular: negative  Gastrointestinal: negative  Genitourinary: As per HPI  Integument: negative  Neurological: negative  Endocrine: negative          PHYSICAL EXAM  /76 (Site: Right Upper Arm, Position: Sitting)   Pulse 71   Wt 157 lb 9.6 oz (71.5 kg)   BMI 30.78 kg/m²   No LMP recorded. Patient has had an injection. General appearance: alert, cooperative and in no acute distress.   Head: NCAT Abdomen: soft, non-tender; bowel sounds normal; no masses,  no organomegaly  Psych: No acute distress, mood and affect full range  Neuro: Alert and oriented, no focal deficits     Pelvic Exam:   EXTERNAL GENITALIA: normal external genitalia, no external lesions  VAGINA: normal rugae, no discharge   CERVIX: normal appearing, no lesions, no bleeding  UTERUS: uterus is normal size, shape, consistency and nontender   ADNEXA: normal adnexa in size, nontender and no masses. RECTUM: no hemorrhoids or rectal masses. ASSESSMENT :      Diagnosis Orders   1. Secondary oligomenorrhea  Follicle Stimulating Hormone    HCG, QUANTITATIVE, PREGNANCY    TSH    Prolactin      2. Screening for cervical cancer  PAP SMEAR      3. Family history of breast cancer in first degree relative  Miscellaneous Sendout           PLAN:    Return in about 3 weeks (around 12/21/2022) for Review results. Discussed possible anovulation vs depo as cause of amenorrhea. No orders of the defined types were placed in this encounter. Orders Placed This Encounter   Procedures    Follicle Stimulating Hormone     Standing Status:   Future     Standing Expiration Date:   11/30/2023    HCG, QUANTITATIVE, PREGNANCY     Standing Status:   Future     Standing Expiration Date:   11/30/2023    TSH     Standing Status:   Future     Standing Expiration Date:   11/30/2023    Prolactin     Standing Status:   Future     Standing Expiration Date:   11/30/2023    PAP SMEAR     Order Specific Question:   Collection Type     Answer: Thin Prep     Order Specific Question:   Prior Abnormal Pap Test     Answer:   No     Order Specific Question:   Screening or Diagnostic     Answer:   Screening     Order Specific Question:   Additional STD Testing     Answer:   N/A     Order Specific Question:   HPV Requested?      Answer:   HPV Co-Test    Miscellaneous Sendout     Standing Status:   Future     Standing Expiration Date:   11/30/2023     Order Specific Question: Specify Req.  Test (1 Test/Order)     Answer:   BRCA screening         Electronically signed by Beto Diallo DO on 11/30/22

## 2022-11-30 NOTE — PROGRESS NOTES
Patient alert and pleasant with no complaints  Here today to establish care. [facility]  #[id number], Alessandro Ramirez present for interpretation. Assisted with pelvic exam, pap smear obtained, labeled and sent to lab. Urine obtained for pregnancy with negative results. Depo-provera 150 mg ordered. Urine for pregnancy obtained with negative results. Depo-provera 150 mg IM given in the right dorsogluteal without difficulty. Discharge instructions have been discussed with the patient. Patient advised to call our office with any questions or concerns. Voiced understanding.

## 2022-12-01 LAB
HPV SAMPLE: NORMAL
SOURCE: NORMAL

## 2022-12-02 ENCOUNTER — OFFICE VISIT (OUTPATIENT)
Dept: INTERNAL MEDICINE | Age: 47
End: 2022-12-02

## 2022-12-02 VITALS
SYSTOLIC BLOOD PRESSURE: 112 MMHG | HEART RATE: 91 BPM | TEMPERATURE: 98 F | BODY MASS INDEX: 26.63 KG/M2 | DIASTOLIC BLOOD PRESSURE: 75 MMHG | HEIGHT: 64 IN | OXYGEN SATURATION: 98 % | WEIGHT: 156 LBS

## 2022-12-02 DIAGNOSIS — Z11.59 ENCOUNTER FOR HEPATITIS C SCREENING TEST FOR LOW RISK PATIENT: Primary | ICD-10-CM

## 2022-12-02 DIAGNOSIS — N64.4 BREAST PAIN: ICD-10-CM

## 2022-12-02 DIAGNOSIS — Z13.220 SCREENING FOR HYPERCHOLESTEROLEMIA: ICD-10-CM

## 2022-12-02 DIAGNOSIS — E66.3 OVERWEIGHT (BMI 25.0-29.9): ICD-10-CM

## 2022-12-02 DIAGNOSIS — Z11.4 SCREENING FOR HIV (HUMAN IMMUNODEFICIENCY VIRUS): ICD-10-CM

## 2022-12-02 DIAGNOSIS — N91.1 SECONDARY AMENORRHEA: ICD-10-CM

## 2022-12-02 PROCEDURE — 99212 OFFICE O/P EST SF 10 MIN: CPT | Performed by: STUDENT IN AN ORGANIZED HEALTH CARE EDUCATION/TRAINING PROGRAM

## 2022-12-02 ASSESSMENT — ENCOUNTER SYMPTOMS
BACK PAIN: 0
BLOOD IN STOOL: 0
DIARRHEA: 0
VOMITING: 0
COUGH: 0
SHORTNESS OF BREATH: 0
SINUS PAIN: 0
CONSTIPATION: 0
NAUSEA: 0
ABDOMINAL PAIN: 0

## 2022-12-02 NOTE — PATIENT INSTRUCTIONS
Necesita hacer los exámenes de gabriela antes de la próxima gonzalez  Lab Tests to get prior to next Visit   Examenes de gabriela: cholesterol, HIV, hepatitis  Examenes de gabriela de Jourdan muhammaden    [employer] la oficina de [place] a [phone] para hacer la gonzalez para colonoscopia      Por favor, vuelva a esta clínica en 3 meses . Llámenos si jose síntomas empeoran ó no mejoran. Please follow up in 3 months with our clinic. Please call if your symptoms worsen or fail to improve.

## 2022-12-02 NOTE — PROGRESS NOTES
Mckenzie #[id number]    Has follow up scheduled for February. Received depo provera by paula at visit    Has mammo and breast US scheduled 12/12. Breast pain went away since last visit. Agress to get labwork orderedc by [employer] passed away from colon cancer. Sister has breast cancer passed away nov 4th. *** add lipid panel.        Stop by [id number] [facility] office      [id number]

## 2022-12-02 NOTE — PROGRESS NOTES
[facility]  Internal Medicine Residency Clinic    Attending Physician Statement  I have discussed the case, including pertinent history and exam findings with the resident physician. I agree with the assessment, plan and orders as documented by the resident. I have reviewed all pertinent PMHx, PSHx, FamHx, SocialHx, medications, and allergies and updated history as appropriate. Patient presents for routine follow up of medical problems. Dysmenorrhea with occasional mastalgia  On long-term depo provera previously and back on after recent GYN visit, PAP done (esults pending at this time)     Family hx breast and colorectal cancer   Mammogram and US scheduled  Referral to general surgery -- appt for screening colonoscopy to be scheduled    Remainder of medical problems as per resident note.     Stephanie Lao DO  12/2/2022 9:59 AM

## 2022-12-02 NOTE — PROGRESS NOTES
[facility]  Internal Medicine Residency Program  [facility] Note      SUBJECTIVE:  CC: had concerns including Annual Exam.    HPI:  Cassie Landau is a 41 y. o.female with PMH of breast pain presenting to [facility] for 3 month routine follow up.  used for this encounter: Mckenzie #[id number]    Pt was seen 3 months ago for acute breast pain. She was ordered mammogram and breast US, now scheduled for 12/12/22. Will review results when available. Pt was also referred to OBGYN for depo-provera, which she was on in the past, as well as cramping without menses. Pt was on depo-provera injections [id number] in [facility], last injection was May 2022 but did not have menses until August 2022. No menses since August, yet having cramping episodes that feel similar to menstrual cramps. Pap and HPV testing pending from visit to OBGYN Dr. Latisha Melo on 11/30. Pt received depo-provera injection that visit. Labwork ordered, not yet completed. Pt states she will go to the lab on Monday. Follow up with OBGYN planned for March 2023. Breast pain resolved since September visit. She has a family hx of colon cancer in mother and breast cancer in sister. Sister just passed away 11/4/22 from breast cancer. Pt referred to colonoscopy last visit but [address] [place] [address] office did not have availability at that time. Pt provided with phone number for scheduling. Review of Systems   Constitutional:  Negative for chills, diaphoresis and fever. HENT:  Negative for congestion, hearing loss, mouth sores and sinus pain. Respiratory:  Negative for cough and shortness of breath. Cardiovascular:  Negative for chest pain, palpitations and leg swelling. Gastrointestinal:  Negative for abdominal pain, blood in stool, constipation, diarrhea, nausea and vomiting. Genitourinary:  Negative for dysuria, frequency, hematuria and urgency. Musculoskeletal:  Negative for arthralgias, back pain and neck pain.    Neurological:  Negative for dizziness, seizures, weakness and light-headedness. No outpatient medications have been marked as taking for the 12/2/22 encounter (Office Visit) with Estela Gonzalez MD.       OBJECTIVE:    VS:   /75   Pulse 91   Temp 98 °F (36.7 °C) (Temporal)   Ht 5' 4.17\" (1.63 m)   Wt 156 lb (70.8 kg)   SpO2 98%   BMI 26.63 kg/m²     EXAM:  Physical Exam  Constitutional:       Appearance: Normal appearance. She is not ill-appearing or diaphoretic. HENT:      Head: Normocephalic and atraumatic. Cardiovascular:      Rate and Rhythm: Normal rate and regular rhythm. Heart sounds: No murmur heard. No friction rub. No gallop. Pulmonary:      Effort: No respiratory distress. Breath sounds: No stridor. No wheezing, rhonchi or rales. Abdominal:      General: Bowel sounds are normal. There is no distension. Palpations: Abdomen is soft. There is no mass. Tenderness: There is no abdominal tenderness. There is no guarding or rebound. Hernia: No hernia is present. Musculoskeletal:         General: No swelling or tenderness. Right lower leg: No edema. Left lower leg: No edema. Skin:     Coloration: Skin is not jaundiced. Findings: No erythema or rash. Neurological:      Mental Status: She is alert and oriented to person, place, and time. ASSESSMENT/PLAN:  I have reviewed all pertinent PMH, PSH, FH, SH, medications and allergies and updated history as appropriate. Candice Pop was seen today for annual exam.    Diagnoses and all orders for this visit:    Encounter for hepatitis C screening test for low risk patient  -     Hepatitis C Antibody; Future    Breast pain    Secondary amenorrhea    Screening for HIV (human immunodeficiency virus)  -     HIV Screen; Future    Screening for hypercholesterolemia  -     LIPID PANEL; Future    Overweight (BMI 25.0-29.9)  -     LIPID PANEL;  Future      RTC: 3 months    I have reviewed my findings and recommendations with Melani Henderson and Dr Shameka Wagner MD PGY-2  12/2/2022 12:53 PM

## 2022-12-06 ENCOUNTER — HOSPITAL ENCOUNTER (OUTPATIENT)
Age: 47
Discharge: HOME | End: 2022-12-06

## 2022-12-06 DIAGNOSIS — N91.4 SECONDARY OLIGOMENORRHEA: ICD-10-CM

## 2022-12-06 DIAGNOSIS — Z11.59 ENCOUNTER FOR HEPATITIS C SCREENING TEST FOR LOW RISK PATIENT: ICD-10-CM

## 2022-12-06 DIAGNOSIS — Z11.4 SCREENING FOR HIV (HUMAN IMMUNODEFICIENCY VIRUS): ICD-10-CM

## 2022-12-06 DIAGNOSIS — Z13.220 SCREENING FOR HYPERCHOLESTEROLEMIA: ICD-10-CM

## 2022-12-06 DIAGNOSIS — E66.3 OVERWEIGHT (BMI 25.0-29.9): ICD-10-CM

## 2022-12-06 LAB
CHOLESTEROL, TOTAL: 221 MG/DL (ref 0–199)
FOLLICLE STIMULATING HORMONE: 3.3 MIU/ML
HDLC SERPL-MCNC: 44 MG/DL
LDL CHOLESTEROL CALCULATED: 145 MG/DL (ref 0–99)
PROLACTIN: 9.3 NG/ML
TRIGL SERPL-MCNC: 162 MG/DL (ref 0–149)
TSH SERPL DL<=0.05 MIU/L-ACNC: 2.24 UIU/ML (ref 0.27–4.2)
VLDLC SERPL CALC-MCNC: 32 MG/DL

## 2022-12-06 PROCEDURE — 83001 ASSAY OF GONADOTROPIN (FSH): CPT

## 2022-12-06 PROCEDURE — 84443 ASSAY THYROID STIM HORMONE: CPT

## 2022-12-06 PROCEDURE — 84146 ASSAY OF PROLACTIN: CPT

## 2022-12-06 PROCEDURE — 36415 COLL VENOUS BLD VENIPUNCTURE: CPT

## 2022-12-06 PROCEDURE — 86803 HEPATITIS C AB TEST: CPT

## 2022-12-06 PROCEDURE — 80061 LIPID PANEL: CPT

## 2022-12-06 PROCEDURE — 87389 HIV-1 AG W/HIV-1&-2 AB AG IA: CPT

## 2022-12-09 LAB — HEPATITIS C ANTIBODY INTERPRETATION: NORMAL

## 2022-12-10 LAB
Lab: NORMAL
REPORT: NORMAL
THIS TEST SENT TO: NORMAL

## 2022-12-12 ENCOUNTER — HOSPITAL ENCOUNTER (OUTPATIENT)
Dept: GENERAL RADIOLOGY | Age: 47
Discharge: HOME | End: 2022-12-14

## 2022-12-12 VITALS — HEIGHT: 60 IN | BODY MASS INDEX: 30.47 KG/M2

## 2022-12-12 DIAGNOSIS — Z80.3 FAMILY HISTORY OF MALIGNANT NEOPLASM OF BREAST: ICD-10-CM

## 2022-12-12 DIAGNOSIS — N64.4 BREAST PAIN, LEFT: ICD-10-CM

## 2022-12-12 DIAGNOSIS — Z80.3 FAMILY HISTORY OF BREAST CANCER IN FIRST DEGREE RELATIVE: ICD-10-CM

## 2022-12-12 DIAGNOSIS — N64.4 BREAST PAIN: ICD-10-CM

## 2022-12-12 PROCEDURE — G0279 TOMOSYNTHESIS, MAMMO: HCPCS

## 2023-01-11 ENCOUNTER — OFFICE VISIT (OUTPATIENT)
Dept: OBGYN | Age: 48
End: 2023-01-11

## 2023-01-11 VITALS
DIASTOLIC BLOOD PRESSURE: 79 MMHG | BODY MASS INDEX: 30.62 KG/M2 | WEIGHT: 156.8 LBS | HEART RATE: 77 BPM | SYSTOLIC BLOOD PRESSURE: 117 MMHG

## 2023-01-11 DIAGNOSIS — N91.4 SECONDARY OLIGOMENORRHEA: Primary | ICD-10-CM

## 2023-01-11 DIAGNOSIS — N94.6 DYSMENORRHEA: ICD-10-CM

## 2023-01-11 PROCEDURE — 99213 OFFICE O/P EST LOW 20 MIN: CPT | Performed by: OBSTETRICS & GYNECOLOGY

## 2023-01-11 PROCEDURE — 99212 OFFICE O/P EST SF 10 MIN: CPT | Performed by: OBSTETRICS & GYNECOLOGY

## 2023-01-11 RX ORDER — MEDROXYPROGESTERONE ACETATE 150 MG/ML
150 INJECTION, SUSPENSION INTRAMUSCULAR ONCE
Status: CANCELLED | OUTPATIENT
Start: 2023-01-11 | End: 2023-01-11

## 2023-01-11 NOTE — PROGRESS NOTES
HISTORY OF PRESENT ILLNESS:  Pt seen with . Pt presents for follow up for complaint of irregular menses. Pt was previously on Depo-provera, last injection was 2022. Since then she has only had one period that was in August. Pt c/o cramping despite not having periods. Pt has since started on depo. FSH 3.3, prolactin and tsh wnl. Past Medical History: No past medical history on file. OB History    Para Term  AB Living   5 4 2   1     SAB IAB Ectopic Molar Multiple Live Births                    # Outcome Date GA Lbr Tito/2nd Weight Sex Delivery Anes PTL Lv   5 Para      CS-LTranv      4 Para 2012     CS-LTranv      3 Term            2 Term            1 AB                  Past Surgical History: No past surgical history on file. Allergies: Patient has no known allergies. Medications:   Current Outpatient Medications   Medication Sig Dispense Refill    naproxen (NAPROSYN) 500 MG tablet Take 1 tablet by mouth 2 times daily for 7 days (Patient not taking: No sig reported) 14 tablet 0     No current facility-administered medications for this visit. Social History:   Social History     Tobacco Use    Smoking status: Never    Smokeless tobacco: Never   Substance Use Topics    Alcohol use: Never        Family History:   Family History   Problem Relation Age of Onset    Colon Cancer Mother 71    Breast Cancer Sister 50       REVIEW OF SYSTEMS:    Constitutional: negative  HEENT: negative  Breast: negative  Respiratory: negative  Cardiovascular: negative  Gastrointestinal: negative  Genitourinary:negative  Integument: negative  Neurological: negative  Endocrine: negative          PHYSICAL EXAM  /79 (Site: Left Upper Arm, Position: Sitting)   Pulse 77   Wt 156 lb 12.8 oz (71.1 kg)   LMP 2022 (Approximate)   BMI 30.62 kg/m²   Patient's last menstrual period was 2022 (approximate).     General appearance: alert, cooperative and in no acute distress. Head: NCAT   Psych: No acute distress, mood and affect full range  Neuro: Alert and oriented, no focal deficits          ASSESSMENT :   Diagnosis Orders   1. Secondary oligomenorrhea        2. Dysmenorrhea             PLAN:    Return in about 1 year (around 1/11/2024) for Annual exam.      No orders of the defined types were placed in this encounter. No orders of the defined types were placed in this encounter.         Electronically signed by Lucho Gonzalez DO on 1/11/23

## 2023-01-26 ENCOUNTER — OFFICE VISIT (OUTPATIENT)
Dept: SURGERY | Age: 48
End: 2023-01-26

## 2023-01-26 VITALS
RESPIRATION RATE: 16 BRPM | HEIGHT: 60 IN | DIASTOLIC BLOOD PRESSURE: 82 MMHG | HEART RATE: 85 BPM | TEMPERATURE: 98.7 F | WEIGHT: 155 LBS | SYSTOLIC BLOOD PRESSURE: 131 MMHG | BODY MASS INDEX: 30.43 KG/M2

## 2023-01-26 DIAGNOSIS — Z80.0 FAMILY HISTORY OF COLON CANCER IN MOTHER: Primary | ICD-10-CM

## 2023-01-26 PROCEDURE — 99202 OFFICE O/P NEW SF 15 MIN: CPT | Performed by: SURGERY

## 2023-01-26 PROCEDURE — S0285 CNSLT BEFORE SCREEN COLONOSC: HCPCS | Performed by: SURGERY

## 2023-01-26 RX ORDER — POLYETHYLENE GLYCOL 3350, SODIUM SULFATE ANHYDROUS, SODIUM BICARBONATE, SODIUM CHLORIDE, POTASSIUM CHLORIDE 236; 22.74; 6.74; 5.86; 2.97 G/4L; G/4L; G/4L; G/4L; G/4L
4 POWDER, FOR SOLUTION ORAL ONCE
Qty: 4000 ML | Refills: 0 | Status: SHIPPED | OUTPATIENT
Start: 2023-01-26 | End: 2023-01-26

## 2023-01-26 NOTE — PATIENT INSTRUCTIONS
[address] [place] en 2 días con Golytely o Nulytely  PREPARACIÓN DEL COLON PARA COLONOSCOPÍA O CIRUGÍA DE COLON    Es muy importante que siga todas las instrucciones que se indican en esta hoja con mucho cuidado (pueden ser algo diferentes de las instrucciones en el producto que compra en la farmacia) para asegurarse de que dickinson colon está adecuadamente limpio o el riesgo que corre de sufrir complicaciones podría aumentar.    2 días o más antes de la endoscopía:  Compre Golytely, Colyte o Nulytely, dependiendo de la receta médica que le daniel el cirujano, en la farmacia (llame a la farmacia con anticipación si va a pedir un producto con sabor para asegurarse de que lo tengan disponible). Mezcle Golytely, Colyte o Nulytely según las instrucciones y guarde en el refrigerador. No coma maíz, tomates, chícharos (guisantes) ni sandía 3 a 5 días antes del procedimiento. Si está recibiendo INSULINA u OTROS MEDICAMENTOS PARA LA DIABETES, pregunte a dickinson médico de atención primaria cómo ajustar dickinson medicación cuando tiene que seguir maureen dieta de líquidos transparentes y no puede comer nada. 1 día antes de la endoscopía:  Ninguna comida sólida: solo líquidos transparentes (sopa, gelatina o jugos a través de los cuales pueda katya, sin comidas sólidas) para el desayuno, almuerzo o rubia. NO tome ni coma nada abrams, ya que hará que el interior del colon quede abrams y parezca gabriela. No tome ni coma nada después de la medianoche. Comience a bridget la solución de Golytely, Colyte o Nulytely temprano en la tarde (entre la 1 pm y las 4 pm, cuanto más temprano, [place]). Libby un vaso de 8 onzas de esta solución cada 10 minutos hasta que haya tomado 12 vasos. Es mejor bridget todo el vaso rápidamente en vez de bridget pequeños sorbos continuamente. Quedarán cuatro vasos de 8 onzas para bridget en la mañana antes de dickinson endoscopía, guárdelos en el refrigerador.   Los movimientos intestinales deberían ocurrir aproximadamente maureen hora después de bridget el primer vaso de Golytely, Colyte o Nulytely. Continuarán periódicamente christine aproximadamente 1 a 2 horas después de que termine de bridget el último vaso. En david momento las heces deberían ser [place] y transparentes. La sensación de hinchazón y/o náuseas son comunes después de los primeros vasos debido al gran volumen de líquido ingerido. Es maureen sensación temporal, y mejorará cuando comiencen los movimientos intestinales. Si tiene náuseas o vómitos, avise a dickinson médico.      Día de la endoscopía:  No tome ni coma nada después de la medianoche la noche antes de la endoscopía hasta 5 horas antes de la hora programada para dickinson endoscopía. Luego tome nuevamente la preparación que sobró, tome un vaso de 8 onzas cada 10 minutos hasta que haya tomado los cuatro vasos que [place]. Puede bridget un vaso de agua de 8 onzas después de bridget el último vaso de la preparación. No tome nada en las 3 horas antes de dickinson endoscopía. Si está tomando algún medicamento para la presión sanguínea o medicamentos para el corazón en la mañana, debe tomarlos con un sorbo de agua antes de salir para el hospital para dickinson endoscopía. Colonoscopy     Definicin   Maureen colonoscopia es el examen visual del recto y del colon (intestino grueso). El examen se realiza con maureen herramienta llamada colonoscopio. Un colonoscopio es un tubo flexible con maureen cmara pequea en el extremo. Geovanna instrumento permite que el mdico yazmin el interior del recto y del colon. Colonoscopia       [address] para realizar el procedimiento   Se utiliza para examinar, diagnosticar y tratar problemas del intestino grueso.  Geovanna procedimiento con frecuencia se realiza debido a las siguientes razones:   Para determinar la causa de dolor abdominal, hemorragia rectal o un cambio en los hbitos intestinales   Para detectar y tratar el cncer de colon o los plipos de colon   Para obtener muestras de tejido para analizarlas   Para detener maureen hemorragia intestinal   Controlar la respuesta al tratamiento si tiene enfermedad inflamatoria del intestino   Posibles complicaciones   Las complicaciones son poco frecuentes, gregory ningn procedimiento est completamente tracee de riesgos. Si est planificando someterse a maureen colonoscopia, el mdico revisar maureen lista de posibles complicaciones, que pueden incluir:   Sangrado   Perforacin o pinchazo del intestino   Los factores que pueden aumentar el riesgo de complicaciones incluyen:   Condicin renal o cardaca preexistente   Tratamiento con determinados medicamentos, lloyd aspirina y otros frmacos con propiedades anticoagulantes o de dilucin de la gabriela   Ciruga abdominal o radioterapia previa   Colitis activa, diverticulitis u otra enfermedad intestinal aguda   Tratamiento previo con radioterapia   Asegrese de analizar estos riesgos con el mdico antes del procedimiento. [place]? Antes del procedimiento   Probablemente, el mdico milagros lo siguiente:   Examen fsico   Antecedentes clnicos   Revisin de los medicamentos   Estudio de las heces para detectar gabriela oculta   El colon debe estar completamente limpio antes del procedimiento. Cualquier deposicin en el intestino obstaculizar la vista. Esta preparacin [address] antes del procedimiento. Siga las instrucciones de dickinson doctor, las cuales pueden incluir cualquiera de los siguientes mtodos de limpieza:   Enemas : se introduce lquido en el recto para estimular las evacuaciones   Laxantes: medicamentos que provocan maureen evacuacin blanda   Maureen dieta lquida   Medicamentos catrticos orales: maureen cantidad importante de lquido para beber, que estimula la evacuacin   Cmo prepararse para el procedimiento:   Hable con dickinson mdico acerca de jose medicamentos.  Se le puede solicitar que deje de bridget algunos medicamentos christine hasta maureen semana antes del procedimiento, tales lloyd:   Medicamentos antiinflamatorios (p. ej., aspirina )   Anticoagulantes, lloyd clopidogrel (Plavix) o warfarina (Coumadin)   Suplementos de vero o vitaminas que contienen vero   La noche anterior, coma maureen comida liviana. No ingiera ni laura nada despus de la medianoche. Use ropa cmoda. Si tiene diabetes, consulte con el mdico si necesita ajustar la dosis de [place]. Consiga que alguien lo lleve a dickinson casa despus del procedimiento. Anestesia   El mdico puede sedarlo para disminuir el malestar. Descripcin del procedimiento   Deber recostarse sobre el lateral devon, con las rodillas flexionadas y encogidas hacia el pecho. Se insertar lentamente el colonoscopio a travs del recto y BREST intestino. El colonoscopio inyectar aire en el colon. Maureen pequea videocmara adjunta permitir que el mdico visualice el revestimiento del colon en maureen pantalla. El mdico continuar guiando la herramienta a travs del intestino y evaluar el revestimiento. Se puede extraer Taryn Blower del tejido o los plipos christine el procedimiento. Sriram Paddock? Menos de Leona Mom   Doler? La [employer] de las personas informan cierto malestar se inserta el instrumento. Puede sentir calambres, espasmos musculares o dolor abdominal inferior christine el procedimiento. Tambin puede sentir la urgencia de defecar. Informe al mdico si siente algn dolor grave. Despus del procedimiento, los zheng por gases y los calambres son habituales. Estos zheng deben desaparecer cuando se expulsa el gas. Cuidados despus de la ciruga   Si se extrae tejido:   Se enviar al laboratorio para dickinson anlisis. Podra bridget de 1 a 2 semanas [place]. Por lo general, el mdico mahi un informe inicial despus de quitar el instrumento. Se pueden recomendar otras pruebas. Puede ocurrir maureen leve hemorragia christine los primeros long despus del procedimiento.    Cuando regrese al hogar despus del procedimiento, asegrese de seguir las instrucciones del mdico, que pueden incluir:   Retome la administracin de los [employer] lo indique el mdico.   Retome maureen dieta normal a menos que el mdico le indique lo contrario. El sedante lo milagros sentir mareado. Evite conducir, operar maquinaria o bridget decisiones importantes christine el gila del da. Descanse christine el gila del da. Llame a dickinson mdico   Despus de llegar a casa, comunquese con dickinson mdico si presenta cualquiera de las siguientes situaciones:   Sangrado del recto: notifique al mdico si emite maureen cucharada de gabriela o ms. Heces negras y alquitranadas   Dolor abdominal intenso   Abdomen hinchado y renata   Signos de infeccin, incluso fiebre o escalofros   Incapacidad para expulsar gases o defecar   Tos, falta de aire, dolor de pecho, nuseas o vmitos intensos   En mar de [place], llame al servicio de emergencias .

## 2023-01-26 NOTE — PROGRESS NOTES
[facility]  HISTORY & PHYSICAL      CC:  Colorectal cancer screening         HPI:     Meño Tong is here as New patient(1/26/2023). The patient was sent here to discuss colorectal cancer screening. The patient denies any weight loss, rectal bleeding, abdominal pain, or change in bowel habits. There is no family history of IBD. Pt's mom had colon cancer at age 71. The patient has never had a screening colonoscopy. Ipad  used for clinical encounter with Deejay Marr    No past medical history on file. No past surgical history on file.   Social History     Socioeconomic History    Marital status: Unknown     Spouse name: Not on file    Number of children: Not on file    Years of education: Not on file    Highest education level: Not on file   Occupational History    Not on file   Tobacco Use    Smoking status: Never    Smokeless tobacco: Never   Vaping Use    Vaping Use: Never used   Substance and Sexual Activity    Alcohol use: Never    Drug use: Never    Sexual activity: Yes     Partners: Male   Other Topics Concern    Not on file   Social History Narrative    Not on file     Social Determinants of Health     Financial Resource Strain: High Risk    Difficulty of Paying Living Expenses: Hard   Food Insecurity: No Food Insecurity    Worried About Running Out of Food in the Last Year: Never true    Ran Out of Food in the Last Year: Never true   Transportation Needs: Unmet Transportation Needs    Lack of Transportation (Medical): Yes    Lack of Transportation (Non-Medical): Yes   Physical Activity: Not on file   Stress: Not on file   Social Connections: Not on file   Intimate Partner Violence: Not on file   Housing Stability: [address] to Pay for Housing in the Last Year: No    Number of [place] in the Last Year: 2    Unstable Housing in the Last Year: No     No Known Allergies     I have reviewed and confirmed the past medical history, surgical history, social history, allergies in the chart. Medications: I have reviewed the medication list in the chart. Review of Systems:  Review of Systems - History obtained from the patient  General ROS: negative  Psychological ROS: negative  Ophthalmic ROS: negative for - blurry vision, double vision or loss of vision  ENT ROS: negative for - epistaxis, sore throat, vocal changes or malocclusion  Respiratory ROS: negative for - pleuritic pain, shortness of breath or tachypnea  Cardiovascular ROS: negative for - chest pain or palpitations  Gastrointestinal ROS: negative for - abdominal pain or gas/bloating  Genito-Urinary ROS: negative for - hematuria or pelvic pain  Endocrine ROS: negative   Heme ROS: negative   Musculoskeletal ROS: negative  Neurological ROS: negative for - confusion, dizziness, headaches, numbness/tingling, seizures or weakness    Physical Exam   /82   Pulse 85   Temp 98.7 °F (37.1 °C) (Infrared)   Resp 16   Ht 5' (1.524 m)   Wt 155 lb (70.3 kg)   BMI 30.27 kg/m²   Vitals:    01/26/23 1229   BP: 131/82   Pulse: 85   Resp: 16   Temp: 98.7 °F (37.1 °C)       PSYCH: mood and affect normal, alert and oriented x 3  CONSTITUTIONAL: No apparent distress, comfortable  EYES: Sclera white, pupils equal round and reactive to light  ENMT:  Hearing normal, trachea midline, ears externally intact  LYMPH: no lympadenopathy in neck. No lympadenopathy in groins  RESP: Breath sounds were clear and equal with no rales, wheezes, or rhonchi. Respiratory effort was normal with no retractions or use of accessory muscles. CV: Heart sounds were normal with a regular rate and rhythm. No pedal edema  GI/ Abdomen: The abdomen was soft and non distended. There was no tenderness, guarding, rebound, or rigidity. There was no                     masses, hepatosplenomegaly, or hernias.   Rectal -Deferred  MSK: no clubbing/ no cyanosis/ gait normal       Assessment:    High risk for colorectal cancer / family hx of colon cancer    Plan:  Screening colonoscopy  I discussed the options for colorectal cancer screening with the patient including options of fecal occult blood testing with flexible sigmoidoscopy or air contrast barium enema. I also discussed the risks and benefits of colonoscopy with possible biopsy/polypectomy/cauterization. I recommended colonoscopy with deep sedation. The patient understands the risks of bleeding and perforation (<0.1%) and agrees to proceed.   2 days of clear liquids prior to procedure  Golytely split dose prep  Schedule at Kortney Proctor MD, [facility]  1/26/2023  12:55 PM

## 2023-01-27 ENCOUNTER — TELEPHONE (OUTPATIENT)
Dept: SURGERY | Age: 48
End: 2023-01-27

## 2023-01-27 ENCOUNTER — PREP FOR PROCEDURE (OUTPATIENT)
Dept: SURGERY | Age: 48
End: 2023-01-27

## 2023-01-27 PROBLEM — Z80.0 FAMILY HISTORY OF COLON CANCER: Status: ACTIVE | Noted: 2023-01-27

## 2023-01-27 NOTE — TELEPHONE ENCOUNTER
Prior Authorization Form:      DEMOGRAPHICS:                     Patient Name:  Donny Prado  Patient :  1975            Insurance:  Payor: / No coverage found. Insurance ID Number:    Payer/Plan Subscr  Sex Relation Sub.  Ins. ID Effective Group Num         DIAGNOSIS & PROCEDURE:                       Procedure/Operation: High Screening Colonoscopy         CPT Code: 75041    Diagnosis:  Family hx of colon cancer    ICD10 Code: Z80.0    Location:  [facility]    Surgeon:  Samir Way MD      SCHEDULING INFORMATION:                          Date: 2023    Time: 1315              Anesthesia:  MAC/TIVA                                                       Status:  Outpatient          Electronically signed by Goyo Veronica MA on 2023 at 8:21 AM

## 2023-03-13 ENCOUNTER — ANESTHESIA EVENT (OUTPATIENT)
Dept: ENDOSCOPY | Age: 48
End: 2023-03-13

## 2023-03-13 NOTE — PROGRESS NOTES
Using  with AMN language line, I instructed her to arrive at 36 for surgery tomorrow. She repeated the time back to me.

## 2023-03-13 NOTE — ANESTHESIA PRE PROCEDURE
Department of Anesthesiology  Preprocedure Note       Name:  Cassie Landau   Age:  41 y.o.  :  1975                                          MRN:  [id number]         Date:  3/13/2023      Surgeon: Casimiro Massey):  Mike Ritter MD    Procedure: Procedure(s):  HIGH SCREENING TOTAL COLONOSCOPY, POSSIBLE BIOPSY    Medications prior to admission:   Prior to Admission medications    Medication Sig Start Date End Date Taking? Authorizing Provider   medroxyPROGESTERone Acetate (DEPO-PROVERA IM) Inject into the muscle every 3 months   Yes Historical Provider, MD       Current medications:    No current facility-administered medications for this encounter. Current Outpatient Medications   Medication Sig Dispense Refill    medroxyPROGESTERone Acetate (DEPO-PROVERA IM) Inject into the muscle every 3 months         Allergies:  No Known Allergies    Problem List:    Patient Active Problem List   Diagnosis Code    Breast pain N64.4    Secondary amenorrhea N91.1    Family history of colon cancer Z80.0       Past Medical History:  History reviewed. No pertinent past medical history. Past Surgical History:        Procedure Laterality Date     SECTION         Social History:    Social History     Tobacco Use    Smoking status: Never    Smokeless tobacco: Never   Substance Use Topics    Alcohol use: Never                                Counseling given: Not Answered      Vital Signs (Current):   Vitals:    23 0905 23 0930   Weight:  152 lb (68.9 kg)   Height: 5' (1.524 m)                                               BP Readings from Last 3 Encounters:   23 131/82   23 117/79   22 112/75       NPO Status:                                                                                 BMI:   Wt Readings from Last 3 Encounters:   23 155 lb (70.3 kg)   23 156 lb 12.8 oz (71.1 kg)   22 156 lb (70.8 kg)     Body mass index is 29.69 kg/m².     CBC:   Lab Results   Component Value Date/Time    WBC 10.3 09/23/2022 10:59 AM    RBC 4.50 09/23/2022 10:59 AM    HGB 13.3 09/23/2022 10:59 AM    HCT 41.7 09/23/2022 10:59 AM    MCV 92.7 09/23/2022 10:59 AM    RDW 13.2 09/23/2022 10:59 AM     09/23/2022 10:59 AM       CMP: No results found for: NA, K, CL, CO2, BUN, CREATININE, GFRAA, AGRATIO, LABGLOM, GLUCOSE, GLU, PROT, CALCIUM, BILITOT, ALKPHOS, AST, ALT    POC Tests: No results for input(s): POCGLU, POCNA, POCK, POCCL, POCBUN, POCHEMO, POCHCT in the last 72 hours. Coags: No results found for: PROTIME, INR, APTT    HCG (If Applicable):   Lab Results   Component Value Date    PREGTESTUR Negative 11/30/2022        ABGs: No results found for: PHART, PO2ART, EJN6LYS, VJD7RFJ, BEART, K6GJNIWG     Type & Screen (If Applicable):  No results found for: LABABO, LABRH    Drug/Infectious Status (If Applicable):  No results found for: HIV, HEPCAB    COVID-19 Screening (If Applicable): No results found for: COVID19        Anesthesia Evaluation    Airway:           Dental:          Pulmonary:                              Cardiovascular:  Exercise tolerance: good (>4 METS),                     Neuro/Psych:               GI/Hepatic/Renal:             Endo/Other:                     Abdominal:             Vascular: Other Findings:           Anesthesia Plan      MAC     ASA 1       Induction: intravenous.   continuous noninvasive hemodynamic monitor                          Jackie Atkinson MD   3/13/2023

## 2023-03-13 NOTE — PROGRESS NOTES
[facility] [place] PRE-ADMISSION TESTING   ENDOSCOPY/ COLONSCOPY INSTRUCTIONS  PAT- Phone Number: [phone]    ENDOSCOPY/ COLONSCOPY INSTRUCTIONS:   Your surgery time could be changed, we will call you back with the adjustment.    [x] Bowel Prep instructions reviewed.   [x] Colonoscopy- The day prior: No solid foods. Clear liquids only.  [x] Nothing by mouth after midnight. Including no gum, candy, mints, or water.   [x] You may brush your teeth, gargle, but do NOT swallow water.   [x] Do not wear makeup, lotions, powders, deodorant.   [x] Urine Pregnancy test will be preformed the day of surgery. A specimen sample may be brought from home.  [x] Arrange transportation with a responsible adult  to and from the hospital. If you do not have a responsible adult  to transport you, you will need to make arrangements with a medical transportation company. Arrange for someone to be with you for the remainder of the day and for 24 hours after your procedure due to having had anesthesia.    -Who will be your  for transportation?_spouse and aunt_________________   -Who will be staying with you for 24 hrs after your procedure?___spouse_______________    PARKING INSTRUCTIONS:     [x] ARRIVAL DATE & TIME: 3/14 @ 1215  [x] Enter into the [facility] Entrance. Two people may accompany you. Masks are not required but are recommended.    [x] Parking Lot \"I\" is where you will park. It is located on the corner of [facility] [address]. The entrance is on [facility].   Upon entering the parking lot, a voucher ticket will print.    EDUCATION INSTRUCTIONS:    [] Bring a complete list of your medications, please write the last time you took the medicine, give this list to the nurse in Pre-Op.  [] Take only the following medications the morning of surgery with 1-2 ounces of water:   [] Stop all herbal supplements and vitamins 5 days before surgery. Stop NSAIDS 7 days before  surgery. [] DO NOT take any diabetic medicine the morning of surgery. Follow instructions for insulin the day before surgery. [] If you are diabetic and your blood sugar is low or you feel symptomatic, you may drink 1-2 ounces of apple juice or take a glucose tablet.            -The morning of your procedure, you may call the pre-op area if you have concerns about your blood sugar [phone]. [] Use your inhalers the morning of surgery. Bring your emergency inhaler with you day of surgery. [] Follow physician instructions regarding any blood thinners you may be taking. WHAT TO EXPECT:    [x] The day of your procedure you will be greeted and checked in by the [employer].  In addition, you will be registered in the [facility] by a Patient Access Representative. Please bring your photo ID and insurance card. A nurse will greet you in accordance to the time you are needed in the pre-op area to prepare you for surgery. Please do not be discouraged if you are not greeted in the order you arrive as there are many variables that are involved in patient preparation. Your patience is greatly appreciated as you wait for your nurse. Please bring in items such as: books, magazines, newspapers, electronics, or any other items  to occupy your time in the waiting area. [x]  Delays may occur. Staff will make a sincere effort to keep you informed of delays. If any delays occur with your procedure, we apologize ahead of time for your inconvenience as we recognize the value of your time.

## 2023-03-14 ENCOUNTER — HOSPITAL ENCOUNTER (OUTPATIENT)
Age: 48
Setting detail: OUTPATIENT SURGERY
Discharge: HOME | End: 2023-03-14
Attending: SURGERY | Admitting: SURGERY

## 2023-03-14 ENCOUNTER — ANESTHESIA (OUTPATIENT)
Dept: ENDOSCOPY | Age: 48
End: 2023-03-14

## 2023-03-14 VITALS
BODY MASS INDEX: 29.84 KG/M2 | OXYGEN SATURATION: 98 % | SYSTOLIC BLOOD PRESSURE: 118 MMHG | DIASTOLIC BLOOD PRESSURE: 73 MMHG | WEIGHT: 152 LBS | HEIGHT: 60 IN | HEART RATE: 76 BPM | TEMPERATURE: 97 F | RESPIRATION RATE: 18 BRPM

## 2023-03-14 DIAGNOSIS — Z80.0 FAMILY HISTORY OF COLON CANCER: ICD-10-CM

## 2023-03-14 DIAGNOSIS — Z01.812 PRE-OPERATIVE LABORATORY EXAMINATION: Primary | ICD-10-CM

## 2023-03-14 LAB
HCG, URINE, POC: NEGATIVE
Lab: NORMAL
NEGATIVE QC PASS/FAIL: NORMAL
POSITIVE QC PASS/FAIL: NORMAL

## 2023-03-14 PROCEDURE — 3700000001 HC ADD 15 MINUTES (ANESTHESIA): Performed by: SURGERY

## 2023-03-14 PROCEDURE — 6360000002 HC RX W HCPCS: Performed by: NURSE ANESTHETIST, CERTIFIED REGISTERED

## 2023-03-14 PROCEDURE — 2709999900 HC NON-CHARGEABLE SUPPLY: Performed by: SURGERY

## 2023-03-14 PROCEDURE — 2500000003 HC RX 250 WO HCPCS

## 2023-03-14 PROCEDURE — 45378 DIAGNOSTIC COLONOSCOPY: CPT | Performed by: SURGERY

## 2023-03-14 PROCEDURE — 7100000011 HC PHASE II RECOVERY - ADDTL 15 MIN: Performed by: SURGERY

## 2023-03-14 PROCEDURE — 3700000000 HC ANESTHESIA ATTENDED CARE: Performed by: SURGERY

## 2023-03-14 PROCEDURE — 2580000003 HC RX 258: Performed by: SURGERY

## 2023-03-14 PROCEDURE — 3609027000 HC COLONOSCOPY: Performed by: SURGERY

## 2023-03-14 PROCEDURE — 7100000010 HC PHASE II RECOVERY - FIRST 15 MIN: Performed by: SURGERY

## 2023-03-14 RX ORDER — LIDOCAINE HYDROCHLORIDE 20 MG/ML
INJECTION, SOLUTION INFILTRATION; PERINEURAL PRN
Status: DISCONTINUED | OUTPATIENT
Start: 2023-03-14 | End: 2023-03-14 | Stop reason: SDUPTHER

## 2023-03-14 RX ORDER — PROPOFOL 10 MG/ML
INJECTION, EMULSION INTRAVENOUS PRN
Status: DISCONTINUED | OUTPATIENT
Start: 2023-03-14 | End: 2023-03-14 | Stop reason: SDUPTHER

## 2023-03-14 RX ORDER — SODIUM CHLORIDE 0.9 % (FLUSH) 0.9 %
5-40 SYRINGE (ML) INJECTION EVERY 12 HOURS SCHEDULED
Status: DISCONTINUED | OUTPATIENT
Start: 2023-03-14 | End: 2023-03-14 | Stop reason: HOSPADM

## 2023-03-14 RX ORDER — ONDANSETRON 2 MG/ML
4 INJECTION INTRAMUSCULAR; INTRAVENOUS
Status: DISCONTINUED | OUTPATIENT
Start: 2023-03-14 | End: 2023-03-14 | Stop reason: HOSPADM

## 2023-03-14 RX ORDER — SODIUM CHLORIDE 9 MG/ML
INJECTION, SOLUTION INTRAVENOUS CONTINUOUS
Status: DISCONTINUED | OUTPATIENT
Start: 2023-03-14 | End: 2023-03-14 | Stop reason: HOSPADM

## 2023-03-14 RX ORDER — SODIUM CHLORIDE 0.9 % (FLUSH) 0.9 %
5-40 SYRINGE (ML) INJECTION PRN
Status: DISCONTINUED | OUTPATIENT
Start: 2023-03-14 | End: 2023-03-14 | Stop reason: HOSPADM

## 2023-03-14 RX ORDER — SODIUM CHLORIDE 9 MG/ML
INJECTION, SOLUTION INTRAVENOUS PRN
Status: DISCONTINUED | OUTPATIENT
Start: 2023-03-14 | End: 2023-03-14 | Stop reason: HOSPADM

## 2023-03-14 RX ORDER — SODIUM CHLORIDE 9 MG/ML
25 INJECTION, SOLUTION INTRAVENOUS PRN
Status: DISCONTINUED | OUTPATIENT
Start: 2023-03-14 | End: 2023-03-14 | Stop reason: HOSPADM

## 2023-03-14 RX ORDER — FENTANYL CITRATE 50 UG/ML
25 INJECTION, SOLUTION INTRAMUSCULAR; INTRAVENOUS EVERY 5 MIN PRN
Status: DISCONTINUED | OUTPATIENT
Start: 2023-03-14 | End: 2023-03-14 | Stop reason: HOSPADM

## 2023-03-14 RX ADMIN — SODIUM CHLORIDE: 9 INJECTION, SOLUTION INTRAVENOUS at 11:42

## 2023-03-14 RX ADMIN — LIDOCAINE HYDROCHLORIDE 60 MG: 20 INJECTION, SOLUTION INFILTRATION; PERINEURAL at 12:24

## 2023-03-14 RX ADMIN — PROPOFOL 250 MG: 10 INJECTION, EMULSION INTRAVENOUS at 12:24

## 2023-03-14 ASSESSMENT — PAIN - FUNCTIONAL ASSESSMENT: PAIN_FUNCTIONAL_ASSESSMENT: NONE - DENIES PAIN

## 2023-03-14 NOTE — OP NOTE
[address] [place] [address]  LOWER ENDOSCOPY REPORT    DATE OF PROCEDURE: 3/14/2023    SURGEON: Kizzy Modi M.D.    Jodee Orellana: None    PREOPERATIVE DIAGNOSIS: Low risk colorectal cancer screening    POSTOPERATIVE DIAGNOSIS: Same; normal colonoscopy    OPERATION: Total colonoscopy to cecum      ANESTHESIA: Local monitored anesthesia. ESTIMATED BLOOD LOSS: less than 5 ml    COMPLICATIONS: None. SPECIMENS:  Was Not Obtained    HISTORY: The patient is a 41y.o. year old female with history of above preop diagnosis. I recommended colonoscopy with possible biopsy or polypectomy and I explained the risk, benefits, expected outcome, and alternatives to the procedure. Risks included but are not limited to bleeding, infection, respiratory distress, hypotension, and perforation of the colon. The patient understands and is in agreement. PROCEDURE: The patient was given IV conscious sedation per anesthesia. The patient was given supplemental oxygen by nasal cannula. I performed a digital rectal exam and no masses were palpated . The colonoscope was inserted per rectum and advanced under direct vision to the cecum without difficulty. The prep was excellent so exam was adequate. FINDINGS:  Cecum/Ascending colon: appendiceal orifice noted, iliocecal valve visualized, normal    Transverse colon: normal    Descending/Sigmoid colon: normal    Rectum/Anus: examined in normal and retroflexed positions and was normal    The colon was decompressed and the scope was removed. The withdraw time was approximately 7 minutes. The patient tolerated the procedure well.      ASSESSMENT/PLAN:   High fiber diet  Colorectal Cancer Screening - recommend repeat colonoscopy in 10 years      Kizzy Modi MD, [facility]  3/14/2023  12:49 PM

## 2023-03-14 NOTE — ANESTHESIA POSTPROCEDURE EVALUATION
Department of Anesthesiology  Postprocedure Note    Patient: Trudi Mendez  MRN: [id number]  YOB: 1975  Date of evaluation: 3/14/2023      Procedure Summary     Date: 03/14/23 Room / Location: [facility] 01 / [facility]    Anesthesia Start: 1219 Anesthesia Stop: 0178    Procedure: COLONOSCOPY DIAGNOSTIC Diagnosis:       Family history of colon cancer      (Family history of colon cancer [Z80.0])    Surgeons: Chula Starr MD Responsible Provider: Nenita Campbell MD    Anesthesia Type: MAC ASA Status: 1          Anesthesia Type: No value filed.     Julian Phase I: Julian Score: 10    Julian Phase II:        Anesthesia Post Evaluation    Patient location during evaluation: PACU  Patient participation: complete - patient participated  Level of consciousness: awake and alert  Pain score: 2  Airway patency: patent  Nausea & Vomiting: no nausea and no vomiting  Complications: no  Cardiovascular status: blood pressure returned to baseline  Respiratory status: acceptable  Hydration status: euvolemic

## 2023-03-14 NOTE — PROGRESS NOTES
Discharge instructions given with  at the bedside. Patient and  verbalized understanding of discharge instructions.   signed discharge instructions for patient

## 2023-03-14 NOTE — H&P
INTERVAL H&P    Most recent History and Physical was reviewed. Patient seen and examined. No significant changes or updates. Plan to proceed for colonoscopy. The risks, benefits, expected outcomes, and alternatives have been discussed, and patient understands and consents to proceed with the procedure. Love Ivan DO  Resident, PGY-5  3/14/2023  12:10 PM        [facility]  HISTORY & PHYSICAL        CC:  Colorectal cancer screening           HPI:              Mauricio Leavitt is here as New patient(1/26/2023). The patient was sent here to discuss colorectal cancer screening. The patient denies any weight loss, rectal bleeding, abdominal pain, or change in bowel habits. There is no family history of IBD. Pt's mom had colon cancer at age 71. The patient has never had a screening colonoscopy. Ipad  [facility]     Past Medical History   No past medical history on file. Past Surgical History   No past surgical history on file.      Social History               Socioeconomic History    Marital status: Unknown       Spouse name: Not on file    Number of children: Not on file    Years of education: Not on file    Highest education level: Not on file   Occupational History    Not on file   Tobacco Use    Smoking status: Never    Smokeless tobacco: Never   Vaping Use    Vaping Use: Never used   Substance and Sexual Activity    Alcohol use: Never    Drug use: Never    Sexual activity: Yes       Partners: Male   Other Topics Concern    Not on file   Social History Narrative    Not on file      Social Determinants of Health          Financial Resource Strain: High Risk    Difficulty of Paying Living Expenses: Hard   Food Insecurity: No Food Insecurity    Worried About Running Out of Food in the Last Year: Never true    Ran Out of Food in the Last Year: Never true   Transportation Needs: Unmet Transportation Needs    Lack of Transportation (Medical): Yes    Lack of Transportation (Non-Medical): Yes   Physical Activity: Not on file   Stress: Not on file   Social Connections: Not on file   Intimate Partner Violence: Not on file   Housing Stability: Low Risk     Unable to Pay for Housing in the Last Year: No    Number of Places Lived in the Last Year: 2    Unstable Housing in the Last Year: No         No Known Allergies      I have reviewed and confirmed the past medical history, surgical history, social history, allergies in the chart.     Medications: I have reviewed the medication list in the chart.     Review of Systems:  Review of Systems - History obtained from the patient  General ROS: negative  Psychological ROS: negative  Ophthalmic ROS: negative for - blurry vision, double vision or loss of vision  ENT ROS: negative for - epistaxis, sore throat, vocal changes or malocclusion  Respiratory ROS: negative for - pleuritic pain, shortness of breath or tachypnea  Cardiovascular ROS: negative for - chest pain or palpitations  Gastrointestinal ROS: negative for - abdominal pain or gas/bloating  Genito-Urinary ROS: negative for - hematuria or pelvic pain  Endocrine ROS: negative   Heme ROS: negative   Musculoskeletal ROS: negative  Neurological ROS: negative for - confusion, dizziness, headaches, numbness/tingling, seizures or weakness     Physical Exam   /82   Pulse 85   Temp 98.7 °F (37.1 °C) (Infrared)   Resp 16   Ht 5' (1.524 m)   Wt 155 lb (70.3 kg)   BMI 30.27 kg/m²       Vitals:     01/26/23 1229   BP: 131/82   Pulse: 85   Resp: 16   Temp: 98.7 °F (37.1 °C)         PSYCH: mood and affect normal, alert and oriented x 3  CONSTITUTIONAL: No apparent distress, comfortable  EYES: Sclera white, pupils equal round and reactive to light  ENMT:  Hearing normal, trachea midline, ears externally intact  LYMPH: no lympadenopathy in neck. No lympadenopathy in groins  RESP: Breath sounds were clear and equal with no rales, wheezes, or  rhonchi. Respiratory effort was normal with no retractions or use of accessory muscles. CV: Heart sounds were normal with a regular rate and rhythm. No pedal edema  GI/ Abdomen: The abdomen was soft and non distended. There was no tenderness, guarding, rebound, or rigidity. There was no                     masses, hepatosplenomegaly, or hernias. Rectal -Deferred  MSK: no clubbing/ no cyanosis/ gait normal         Assessment:    High risk for colorectal cancer / family hx of colon cancer     Plan:  Screening colonoscopy  I discussed the options for colorectal cancer screening with the patient including options of fecal occult blood testing with flexible sigmoidoscopy or air contrast barium enema. I also discussed the risks and benefits of colonoscopy with possible biopsy/polypectomy/cauterization. I recommended colonoscopy with deep sedation. The patient understands the risks of bleeding and perforation (<0.1%) and agrees to proceed.   2 days of clear liquids prior to procedure  Golytely split dose prep  Schedule at [address] [place], MD, [facility]  1/26/2023  12:55 PM

## 2023-03-14 NOTE — DISCHARGE INSTRUCTIONS
Ambreen Sparks  [address] [facility] [address]  Phone: [phone]  Fax:  [phone]    POST-PROCEDURE INSTRUCTIONS FOR COLONOSCOPY    Giuseppe Khan  3/14/2023    You underwent an colonoscopy today     You were found to have Normal colonoscopy to the cecum with no evidence of neoplasia, diverticular disease or mucosal abnormality. You may experience: Increased amount of gas. Slight abdominal bloating and/or cramping. Diet:  You may resume your normal diet. Plan: You may resume your prescribed medications  . For colon cancer screening in this average-risk patient, colonoscopy may be repeated in 10 year years    Activity: no driving today. Ok to resume regular activity starting tomorrow    Follow-up: Please call [phone] with any questions or concerns and to schedule a follow up visit as needed with Dr. Jen Sparks.       At the following locations:  [address] [facility]   [address] [facility], [address] [facility]   [address] [facility], [address] [address]

## 2023-03-14 NOTE — ANESTHESIA PRE PROCEDURE
Department of Anesthesiology  Preprocedure Note       Name:  Brenda Freed   Age:  47 y.o.  :  1975                                          MRN:  [id number]         Date:  3/14/2023      Surgeon: Surgeon(s):  Gabriel Causey MD    Procedure: Procedure(s):  COLONOSCOPY DIAGNOSTIC    Medications prior to admission:   Prior to Admission medications    Medication Sig Start Date End Date Taking? Authorizing Provider   medroxyPROGESTERone Acetate (DEPO-PROVERA IM) Inject into the muscle every 3 months    Historical Provider, MD       Current medications:    No current facility-administered medications for this visit.     No current outpatient medications on file.     Facility-Administered Medications Ordered in Other Visits   Medication Dose Route Frequency Provider Last Rate Last Admin   • 0.9 % sodium chloride infusion   IntraVENous Continuous Gabriel Causey  mL/hr at 23 1219 Restarted at 23 1244   • sodium chloride flush 0.9 % injection 5-40 mL  5-40 mL IntraVENous 2 times per day Gabriel Causey MD       • sodium chloride flush 0.9 % injection 5-40 mL  5-40 mL IntraVENous PRN Gabriel Causey MD       • 0.9 % sodium chloride infusion  25 mL IntraVENous PRN Gabriel Causey MD           Allergies:  No Known Allergies    Problem List:    Patient Active Problem List   Diagnosis Code   • Breast pain N64.4   • Secondary amenorrhea N91.1   • Family history of colon cancer Z80.0       Past Medical History:  No past medical history on file.    Past Surgical History:        Procedure Laterality Date   •  SECTION         Social History:    Social History     Tobacco Use   • Smoking status: Never   • Smokeless tobacco: Never   Substance Use Topics   • Alcohol use: Never                                Counseling given: Not Answered      Vital Signs (Current):   There were no vitals filed for this visit.                                           BP Readings from Last 3 Encounters:  03/14/23 125/81   01/26/23 131/82   01/11/23 117/79       NPO Status:                                                                                 BMI:   Wt Readings from Last 3 Encounters:   03/14/23 152 lb (68.9 kg)   01/26/23 155 lb (70.3 kg)   01/11/23 156 lb 12.8 oz (71.1 kg)     There is no height or weight on file to calculate BMI.    CBC:   Lab Results   Component Value Date/Time    WBC 10.3 09/23/2022 10:59 AM    RBC 4.50 09/23/2022 10:59 AM    HGB 13.3 09/23/2022 10:59 AM    HCT 41.7 09/23/2022 10:59 AM    MCV 92.7 09/23/2022 10:59 AM    RDW 13.2 09/23/2022 10:59 AM     09/23/2022 10:59 AM       CMP: No results found for: NA, K, CL, CO2, BUN, CREATININE, GFRAA, AGRATIO, LABGLOM, GLUCOSE, GLU, PROT, CALCIUM, BILITOT, ALKPHOS, AST, ALT    POC Tests: No results for input(s): POCGLU, POCNA, POCK, POCCL, POCBUN, POCHEMO, POCHCT in the last 72 hours. Coags: No results found for: PROTIME, INR, APTT    HCG (If Applicable):   Lab Results   Component Value Date    PREGTESTUR Negative 11/30/2022        ABGs: No results found for: PHART, PO2ART, ZFR8TJT, YIL4CVK, BEART, W4XKFPNY     Type & Screen (If Applicable):  No results found for: LABABO, LABRH    Drug/Infectious Status (If Applicable):  No results found for: HIV, HEPCAB    COVID-19 Screening (If Applicable): No results found for: COVID19        Anesthesia Evaluation    Airway: Mallampati: I  TM distance: >3 FB   Neck ROM: full  Mouth opening: > = 3 FB   Dental: normal exam         Pulmonary:normal exam                               Cardiovascular:  Exercise tolerance: good (>4 METS),                     Neuro/Psych:               GI/Hepatic/Renal:             Endo/Other:                     Abdominal:             Vascular: Other Findings:             Anesthesia Plan      MAC     ASA 1       Induction: intravenous.   continuous noninvasive hemodynamic monitor  MIPS: Postoperative opioids intended and Prophylactic antiemetics administered. Anesthetic plan and risks discussed with patient. Plan discussed with CRNA.                     Jami Ponce MD   3/14/2023

## 2023-03-31 ENCOUNTER — NURSE ONLY (OUTPATIENT)
Dept: OBGYN | Age: 48
End: 2023-03-31

## 2023-03-31 VITALS — HEART RATE: 87 BPM | DIASTOLIC BLOOD PRESSURE: 86 MMHG | SYSTOLIC BLOOD PRESSURE: 127 MMHG

## 2023-03-31 DIAGNOSIS — N94.6 DYSMENORRHEA: Primary | ICD-10-CM

## 2023-03-31 LAB
CONTROL: NORMAL
PREGNANCY TEST URINE, POC: NEGATIVE

## 2023-03-31 RX ORDER — MEDROXYPROGESTERONE ACETATE 150 MG/ML
150 INJECTION, SUSPENSION INTRAMUSCULAR ONCE
Status: COMPLETED | OUTPATIENT
Start: 2023-03-31 | End: 2023-03-31

## 2023-03-31 RX ADMIN — MEDROXYPROGESTERONE ACETATE 150 MG: 150 INJECTION, SUSPENSION INTRAMUSCULAR at 10:43

## 2023-03-31 NOTE — PROGRESS NOTES
Patient alert and pleasant with no concerns  Here today for Depo-provera. AMN  #[id number], Lindalou Gottron, available via IPAD for interpretation. Urine for pregnancy obtained with negative results. Depo-provera 150 mg IM given in the left dorsogluteal without difficulty. Discharge instructions have been discussed with the patient. Patient advised to call our office with any questions or concerns. Voiced understanding.

## 2023-04-19 ENCOUNTER — OFFICE VISIT (OUTPATIENT)
Dept: INTERNAL MEDICINE | Age: 48
End: 2023-04-19

## 2023-04-19 VITALS
RESPIRATION RATE: 18 BRPM | WEIGHT: 153 LBS | BODY MASS INDEX: 30.04 KG/M2 | DIASTOLIC BLOOD PRESSURE: 83 MMHG | HEART RATE: 76 BPM | OXYGEN SATURATION: 99 % | SYSTOLIC BLOOD PRESSURE: 128 MMHG | HEIGHT: 60 IN | TEMPERATURE: 97 F

## 2023-04-19 DIAGNOSIS — N91.1 SECONDARY AMENORRHEA: ICD-10-CM

## 2023-04-19 DIAGNOSIS — Z80.0 FAMILY HISTORY OF COLON CANCER: Primary | ICD-10-CM

## 2023-04-19 DIAGNOSIS — E78.49 OTHER HYPERLIPIDEMIA: ICD-10-CM

## 2023-04-19 PROCEDURE — 99213 OFFICE O/P EST LOW 20 MIN: CPT | Performed by: STUDENT IN AN ORGANIZED HEALTH CARE EDUCATION/TRAINING PROGRAM

## 2023-04-19 PROCEDURE — 99212 OFFICE O/P EST SF 10 MIN: CPT | Performed by: STUDENT IN AN ORGANIZED HEALTH CARE EDUCATION/TRAINING PROGRAM

## 2023-04-19 ASSESSMENT — ENCOUNTER SYMPTOMS
ABDOMINAL PAIN: 0
BLOOD IN STOOL: 0
CONSTIPATION: 0
VOMITING: 0
COUGH: 0
BACK PAIN: 0
NAUSEA: 0
SINUS PAIN: 0
DIARRHEA: 0
SHORTNESS OF BREATH: 0

## 2023-04-19 ASSESSMENT — PATIENT HEALTH QUESTIONNAIRE - PHQ9
SUM OF ALL RESPONSES TO PHQ9 QUESTIONS 1 & 2: 0
SUM OF ALL RESPONSES TO PHQ QUESTIONS 1-9: 0
SUM OF ALL RESPONSES TO PHQ QUESTIONS 1-9: 0
2. FEELING DOWN, DEPRESSED OR HOPELESS: 0
SUM OF ALL RESPONSES TO PHQ QUESTIONS 1-9: 0
1. LITTLE INTEREST OR PLEASURE IN DOING THINGS: 0
SUM OF ALL RESPONSES TO PHQ QUESTIONS 1-9: 0

## 2023-04-19 NOTE — PATIENT INSTRUCTIONS
Por favor, vuelva a esta clínica en 6 meses . Llámenos si jose síntomas empeoran ó no mejoran. Please follow up in 6 months with our clinic. Please call if your symptoms worsen or fail to improve.

## 2023-04-19 NOTE — PROGRESS NOTES
[facility]    Attending Physician Statement  I have discussed the case, including pertinent history and exam findings with the resident physician. I agree with the assessment, plan and orders as documented by the resident. I have reviewed all pertinent PMHx, PSHx, FamHx, SocialHx, medications, and allergies and updated history as appropriate. Patient here for routine follow up of medical problems. Patient has hx secondary amenorrhea, sees gyn, hyperlipidemia with low ASCVD risk score. Has fam hx colon ca, had  negative colonoscopy 3/14/23. Continue lipid lowering diet, repeat lipid panel in 6 months. Remainder of medical problems as per resident note.     Viviane Nichole,   4/19/2023 9:39 AM
visit:    Family history of colon cancer  - Colonoscopy March 2023 negative will need repeat screen in 10 years    Secondary amenorrhea  - following with OBGYN  - 2/2 depo-provera injections which she would like to continue    Other hyperlipidemia  - provided link to Telugu transcript video today regarding lifestyle modifications. We also discussed in person changing her diet to include more whole grains and fruits and vegetables and to avoid fried foods and breads.  She is very inactive so we discussed getting her heart rate up 5 times per week for 30 minutes with some light cardio       RTC: 6 months for follow up regarding dietary and lfiestyle changes with HLD      I have reviewed my findings and recommendations with De Méndez and Dr Yariel Warren MD PGY-2  4/19/2023 9:56 AM

## 2023-06-23 ENCOUNTER — NURSE ONLY (OUTPATIENT)
Dept: OBGYN | Age: 48
End: 2023-06-23

## 2023-06-23 VITALS
SYSTOLIC BLOOD PRESSURE: 127 MMHG | DIASTOLIC BLOOD PRESSURE: 77 MMHG | WEIGHT: 158.2 LBS | HEART RATE: 90 BPM | BODY MASS INDEX: 30.9 KG/M2

## 2023-06-23 DIAGNOSIS — N93.8 DUB (DYSFUNCTIONAL UTERINE BLEEDING): ICD-10-CM

## 2023-06-23 DIAGNOSIS — Z01.812 PRE-PROCEDURE LAB EXAM: Primary | ICD-10-CM

## 2023-06-23 LAB
CONTROL: NORMAL
PREGNANCY TEST URINE, POC: NEGATIVE

## 2023-06-23 RX ORDER — MEDROXYPROGESTERONE ACETATE 150 MG/ML
150 INJECTION, SUSPENSION INTRAMUSCULAR ONCE
Status: COMPLETED | OUTPATIENT
Start: 2023-06-23 | End: 2023-06-23

## 2023-06-23 RX ORDER — MEDROXYPROGESTERONE ACETATE 150 MG/ML
150 INJECTION, SUSPENSION INTRAMUSCULAR
COMMUNITY

## 2023-06-23 RX ADMIN — MEDROXYPROGESTERONE ACETATE 150 MG: 150 INJECTION, SUSPENSION INTRAMUSCULAR at 10:17

## 2023-06-23 NOTE — PROGRESS NOTES
Interpretor #[id number] on entire visit for translation. Patient alert and pleasant with no concerns  Here today for Depo-provera  Urine for pregnancy obtained with negative results  Depo-provera 150 mg IM given without difficulty  right buttock. Tolerated well.

## 2023-09-15 ENCOUNTER — NURSE ONLY (OUTPATIENT)
Dept: OBGYN | Age: 48
End: 2023-09-15

## 2023-09-15 VITALS
SYSTOLIC BLOOD PRESSURE: 124 MMHG | DIASTOLIC BLOOD PRESSURE: 76 MMHG | HEART RATE: 91 BPM | WEIGHT: 157.8 LBS | BODY MASS INDEX: 30.82 KG/M2

## 2023-09-15 DIAGNOSIS — N93.8 DUB (DYSFUNCTIONAL UTERINE BLEEDING): Primary | ICD-10-CM

## 2023-09-15 LAB
CONTROL: NORMAL
PREGNANCY TEST URINE, POC: NEGATIVE

## 2023-09-15 RX ORDER — MEDROXYPROGESTERONE ACETATE 150 MG/ML
150 INJECTION, SUSPENSION INTRAMUSCULAR ONCE
Status: COMPLETED | OUTPATIENT
Start: 2023-09-15 | End: 2023-09-15

## 2023-09-15 RX ADMIN — MEDROXYPROGESTERONE ACETATE 150 MG: 150 INJECTION, SUSPENSION INTRAMUSCULAR at 10:24

## 2023-09-15 NOTE — PROGRESS NOTES
Patient alert and pleasant with no concerns  Here today for Depo-provera  Urine for pregnancy obtained with negative results. AMN  #[id number], Shaq Ruiz, available via iPAD for interpretation. Depo-provera 150 mg IM given in the right dorsogluteal without difficulty. Discharge instructions have been discussed with the patient. Patient advised to call our office with any questions or concerns. Voiced understanding.

## 2023-12-11 ENCOUNTER — TELEPHONE (OUTPATIENT)
Dept: INTERNAL MEDICINE | Age: 48
End: 2023-12-11

## 2023-12-15 ENCOUNTER — NURSE ONLY (OUTPATIENT)
Dept: OBGYN | Age: 48
End: 2023-12-15

## 2023-12-15 VITALS — BODY MASS INDEX: 30.47 KG/M2 | WEIGHT: 156 LBS

## 2023-12-15 DIAGNOSIS — N93.8 DUB (DYSFUNCTIONAL UTERINE BLEEDING): Primary | ICD-10-CM

## 2023-12-15 LAB
CONTROL: NORMAL
PREGNANCY TEST URINE, POC: NEGATIVE

## 2023-12-15 PROCEDURE — 81025 URINE PREGNANCY TEST: CPT | Performed by: OBSTETRICS & GYNECOLOGY

## 2023-12-15 PROCEDURE — 96372 THER/PROPH/DIAG INJ SC/IM: CPT | Performed by: OBSTETRICS & GYNECOLOGY

## 2023-12-15 RX ORDER — MEDROXYPROGESTERONE ACETATE 150 MG/ML
150 INJECTION, SUSPENSION INTRAMUSCULAR ONCE
Status: COMPLETED | OUTPATIENT
Start: 2023-12-15 | End: 2023-12-15

## 2023-12-15 RX ADMIN — MEDROXYPROGESTERONE ACETATE 150 MG: 150 INJECTION, SUSPENSION INTRAMUSCULAR at 11:55

## 2024-01-11 ENCOUNTER — TELEPHONE (OUTPATIENT)
Dept: INTERNAL MEDICINE | Age: 49
End: 2024-01-11

## 2024-01-11 DIAGNOSIS — Z12.31 ENCOUNTER FOR SCREENING MAMMOGRAM FOR BREAST CANCER: ICD-10-CM

## 2024-01-11 DIAGNOSIS — Z12.31 VISIT FOR SCREENING MAMMOGRAM: ICD-10-CM

## 2024-01-11 DIAGNOSIS — Z12.31 SCREENING MAMMOGRAM FOR HIGH-RISK PATIENT: Primary | ICD-10-CM

## 2024-02-28 ENCOUNTER — HOSPITAL ENCOUNTER (OUTPATIENT)
Dept: GENERAL RADIOLOGY | Age: 49
Discharge: HOME | End: 2024-03-01
Attending: INTERNAL MEDICINE

## 2024-02-28 VITALS — WEIGHT: 156 LBS | BODY MASS INDEX: 30.63 KG/M2 | HEIGHT: 60 IN

## 2024-02-28 DIAGNOSIS — Z12.31 ENCOUNTER FOR SCREENING MAMMOGRAM FOR BREAST CANCER: ICD-10-CM

## 2024-02-28 PROCEDURE — 77063 BREAST TOMOSYNTHESIS BI: CPT

## 2024-03-27 ENCOUNTER — OFFICE VISIT (OUTPATIENT)
Dept: OBGYN | Age: 49
End: 2024-03-27

## 2024-03-27 VITALS
SYSTOLIC BLOOD PRESSURE: 121 MMHG | DIASTOLIC BLOOD PRESSURE: 67 MMHG | HEART RATE: 72 BPM | BODY MASS INDEX: 31.6 KG/M2 | WEIGHT: 161.8 LBS

## 2024-03-27 DIAGNOSIS — N94.6 DYSMENORRHEA: ICD-10-CM

## 2024-03-27 DIAGNOSIS — Z01.419 ENCOUNTER FOR GYNECOLOGICAL EXAMINATION: Primary | ICD-10-CM

## 2024-03-27 DIAGNOSIS — Z91.89 AT HIGH RISK FOR BREAST CANCER: ICD-10-CM

## 2024-03-27 LAB
CONTROL: NORMAL
PREGNANCY TEST URINE, POC: NEGATIVE

## 2024-03-27 PROCEDURE — 96372 THER/PROPH/DIAG INJ SC/IM: CPT | Performed by: OBSTETRICS & GYNECOLOGY

## 2024-03-27 PROCEDURE — 81025 URINE PREGNANCY TEST: CPT | Performed by: OBSTETRICS & GYNECOLOGY

## 2024-03-27 PROCEDURE — 99396 PREV VISIT EST AGE 40-64: CPT | Performed by: OBSTETRICS & GYNECOLOGY

## 2024-03-27 RX ORDER — MEDROXYPROGESTERONE ACETATE 150 MG/ML
150 INJECTION, SUSPENSION INTRAMUSCULAR ONCE
Status: COMPLETED | OUTPATIENT
Start: 2024-03-27 | End: 2024-03-27

## 2024-03-27 RX ADMIN — MEDROXYPROGESTERONE ACETATE 150 MG: 150 INJECTION, SUSPENSION INTRAMUSCULAR at 11:36

## 2024-03-27 NOTE — PROGRESS NOTES
Patient alert and pleasant with no complaints  Here today for annual GYN visit.  AMN  #[id number], Kylah, available via iPAD for interpretation.  Assisted with pelvic exam, no specimen obtained.  Urine for pregnancy obtained with negative results  Depo-provera 150 mg IM given in the right dorsogluteal without difficulty     Discharge instructions have been discussed with the patient. Patient advised to call our office with any questions or concerns.   Voiced understanding.

## 2024-03-27 NOTE — PROGRESS NOTES
Pt seen with .    HISTORY OF PRESENT ILLNESS:    48 y.o. female   presents for her annual exam.     No LMP recorded. Patient has had an injection.  Periods are: none, on depo  Contraception: Depo  Number of new sexual partners: 0, sexually active with   Most recent pap smear:  normal cytology, HPV negative  History of abnormal pap smears: none  Most recent mammogram:  normal, TC score 23%  History of abnormal mammogram: none  Most recent colonoscopy:  normal  Dexa scan:   HPV vaccination:   Changes to health since last visit: none  Complaints: none      Past Medical History: No past medical history on file.                                          OB History    Para Term  AB Living   5 4 2   1     SAB IAB Ectopic Molar Multiple Live Births             2      # Outcome Date GA Lbr Tito/2nd Weight Sex Delivery Anes PTL Lv   5 Para      CS-LTranv      4 Para      CS-LTranv      3 Term            2 Term            1 AB                   Past Surgical History:   Past Surgical History:   Procedure Laterality Date     SECTION      COLONOSCOPY N/A 3/14/2023    COLONOSCOPY DIAGNOSTIC performed by Gabriel Causey MD at [facility] ENDOSCOPY        Allergies: Patient has no known allergies.     Medications:   No current outpatient medications on file.     No current facility-administered medications for this visit.         Social History:   Social History     Tobacco Use    Smoking status: Never    Smokeless tobacco: Never   Substance Use Topics    Alcohol use: Never        Family History:   Family History   Problem Relation Age of Onset    Colon Cancer Mother 69    Breast Cancer Sister 48       REVIEW OF SYSTEMS:    Constitutional: negative  HEENT: negative  Breast: negative  Respiratory: negative  Cardiovascular: negative  Gastrointestinal: negative  Genitourinary:negative  Integument: negative  Neurological: negative  Endocrine: negative          PHYSICAL

## 2024-06-19 ENCOUNTER — NURSE ONLY (OUTPATIENT)
Dept: OBGYN | Age: 49
End: 2024-06-19

## 2024-06-19 VITALS
SYSTOLIC BLOOD PRESSURE: 116 MMHG | HEART RATE: 77 BPM | BODY MASS INDEX: 32.56 KG/M2 | DIASTOLIC BLOOD PRESSURE: 67 MMHG | WEIGHT: 166.7 LBS

## 2024-06-19 DIAGNOSIS — N94.6 DYSMENORRHEA: Primary | ICD-10-CM

## 2024-06-19 LAB
CONTROL: NORMAL
PREGNANCY TEST URINE, POC: NEGATIVE

## 2024-06-19 PROCEDURE — 96372 THER/PROPH/DIAG INJ SC/IM: CPT | Performed by: OBSTETRICS & GYNECOLOGY

## 2024-06-19 PROCEDURE — 81025 URINE PREGNANCY TEST: CPT | Performed by: OBSTETRICS & GYNECOLOGY

## 2024-06-19 RX ORDER — MEDROXYPROGESTERONE ACETATE 150 MG/ML
150 INJECTION, SUSPENSION INTRAMUSCULAR ONCE
Status: COMPLETED | OUTPATIENT
Start: 2024-06-19 | End: 2024-06-19

## 2024-06-19 RX ADMIN — MEDROXYPROGESTERONE ACETATE 150 MG: 150 INJECTION, SUSPENSION INTRAMUSCULAR at 09:20

## 2024-06-19 SDOH — ECONOMIC STABILITY: FOOD INSECURITY: WITHIN THE PAST 12 MONTHS, THE FOOD YOU BOUGHT JUST DIDN'T LAST AND YOU DIDN'T HAVE MONEY TO GET MORE.: NEVER TRUE

## 2024-06-19 SDOH — ECONOMIC STABILITY: INCOME INSECURITY: HOW HARD IS IT FOR YOU TO PAY FOR THE VERY BASICS LIKE FOOD, HOUSING, MEDICAL CARE, AND HEATING?: NOT VERY HARD

## 2024-06-19 SDOH — ECONOMIC STABILITY: FOOD INSECURITY: WITHIN THE PAST 12 MONTHS, YOU WORRIED THAT YOUR FOOD WOULD RUN OUT BEFORE YOU GOT MONEY TO BUY MORE.: NEVER TRUE

## 2024-06-19 NOTE — PROGRESS NOTES
Patient alert and pleasant with no concerns  Here today for Depo-provera. AMN  #[id number], Amirah, available via iPAD for interpretation.  Urine for pregnancy obtained with negative results  Depo-provera 150 mg IM given in the left dorsogluteal without difficulty   Discharge instructions have been discussed with the patient. Patient advised to call our office with any questions or concerns.   Voiced understanding.

## 2024-09-11 ENCOUNTER — NURSE ONLY (OUTPATIENT)
Dept: OBGYN | Age: 49
End: 2024-09-11

## 2024-09-11 VITALS
DIASTOLIC BLOOD PRESSURE: 73 MMHG | HEART RATE: 77 BPM | WEIGHT: 164 LBS | BODY MASS INDEX: 32.03 KG/M2 | SYSTOLIC BLOOD PRESSURE: 111 MMHG

## 2024-09-11 DIAGNOSIS — N94.6 DYSMENORRHEA: Primary | ICD-10-CM

## 2024-09-11 LAB
CONTROL: NORMAL
PREGNANCY TEST URINE, POC: NEGATIVE

## 2024-09-11 PROCEDURE — 81025 URINE PREGNANCY TEST: CPT | Performed by: OBSTETRICS & GYNECOLOGY

## 2024-09-11 PROCEDURE — 96372 THER/PROPH/DIAG INJ SC/IM: CPT | Performed by: OBSTETRICS & GYNECOLOGY

## 2024-09-11 RX ORDER — MEDROXYPROGESTERONE ACETATE 150 MG/ML
150 INJECTION, SUSPENSION INTRAMUSCULAR ONCE
Status: COMPLETED | OUTPATIENT
Start: 2024-09-11 | End: 2024-09-11

## 2024-09-11 RX ADMIN — MEDROXYPROGESTERONE ACETATE 150 MG: 150 INJECTION, SUSPENSION INTRAMUSCULAR at 10:25

## 2024-12-04 ENCOUNTER — NURSE ONLY (OUTPATIENT)
Dept: OBGYN | Age: 49
End: 2024-12-04

## 2024-12-04 VITALS
RESPIRATION RATE: 20 BRPM | WEIGHT: 169.3 LBS | OXYGEN SATURATION: 98 % | SYSTOLIC BLOOD PRESSURE: 119 MMHG | HEIGHT: 60 IN | HEART RATE: 91 BPM | BODY MASS INDEX: 33.24 KG/M2 | TEMPERATURE: 98.1 F | DIASTOLIC BLOOD PRESSURE: 66 MMHG

## 2024-12-04 DIAGNOSIS — N94.6 DYSMENORRHEA: Primary | ICD-10-CM

## 2024-12-04 LAB
CONTROL: NEGATIVE
PREGNANCY TEST URINE, POC: NEGATIVE

## 2024-12-04 PROCEDURE — 90472 IMMUNIZATION ADMIN EACH ADD: CPT | Performed by: OBSTETRICS & GYNECOLOGY

## 2024-12-04 PROCEDURE — 81025 URINE PREGNANCY TEST: CPT | Performed by: OBSTETRICS & GYNECOLOGY

## 2024-12-04 PROCEDURE — 96372 THER/PROPH/DIAG INJ SC/IM: CPT | Performed by: OBSTETRICS & GYNECOLOGY

## 2024-12-04 RX ORDER — MEDROXYPROGESTERONE ACETATE 150 MG/ML
150 INJECTION, SUSPENSION INTRAMUSCULAR ONCE
Status: COMPLETED | OUTPATIENT
Start: 2024-12-04 | End: 2024-12-04

## 2024-12-04 RX ADMIN — MEDROXYPROGESTERONE ACETATE 150 MG: 150 INJECTION, SUSPENSION INTRAMUSCULAR at 10:03

## 2024-12-04 ASSESSMENT — PATIENT HEALTH QUESTIONNAIRE - PHQ9
SUM OF ALL RESPONSES TO PHQ QUESTIONS 1-9: 0
1. LITTLE INTEREST OR PLEASURE IN DOING THINGS: NOT AT ALL
SUM OF ALL RESPONSES TO PHQ9 QUESTIONS 1 & 2: 0
SUM OF ALL RESPONSES TO PHQ QUESTIONS 1-9: 0
2. FEELING DOWN, DEPRESSED OR HOPELESS: NOT AT ALL

## 2024-12-04 NOTE — PROGRESS NOTES
Injections (49 y.o. female,  here for Depo Provera Administration. Date of last Cervical Cancer screen (HPV or PAP): 11/30/2022, Date of last Mammogram: 2/28/2024 )    No LMP recorded (lmp unknown). Patient has had an injection.    POC testing performed this visit:  [x] YES  []NO    Provider notified of the following results:  Results for orders placed or performed in visit on 12/04/24   POCT urine pregnancy   Result Value Ref Range    Preg Test, Ur Negative Negative    Control Negative      Patient tolerated injection well. No complaints of pain or discomfort voiced.     Clinic Admin. Medication/ Immunization:   [x] YES  []NO  Administrations This Visit       medroxyPROGESTERone (DEPO-PROVERA) injection 150 mg       Admin Date  12/04/2024  10:03 Action  Given Dose  150 mg Route  IntraMUSCular Site  Vastus Lateralis Left Documented By  Kiersten Aguilar LPN    NDC: 49989-455-78    Lot#: [id number]    : XIROMED    Patient Supplied?: No                     Next office visit scheduled:  [x] YES  []NO    Scheduled date: 2/26/2025 for Annual and Depo administration with Dr. Barboza    Discharge instructions have been discussed with the patient. Patient advised to call our office with any questions or concerns.   Voiced understanding.

## 2025-02-26 ENCOUNTER — OFFICE VISIT (OUTPATIENT)
Dept: OBGYN | Age: 50
End: 2025-02-26

## 2025-02-26 VITALS
WEIGHT: 166.6 LBS | HEART RATE: 79 BPM | BODY MASS INDEX: 32.54 KG/M2 | SYSTOLIC BLOOD PRESSURE: 121 MMHG | DIASTOLIC BLOOD PRESSURE: 79 MMHG | OXYGEN SATURATION: 97 %

## 2025-02-26 DIAGNOSIS — N94.6 DYSMENORRHEA: Primary | ICD-10-CM

## 2025-02-26 LAB
CONTROL: NORMAL
PREGNANCY TEST URINE, POC: NEGATIVE

## 2025-02-26 PROCEDURE — 81025 URINE PREGNANCY TEST: CPT | Performed by: OBSTETRICS & GYNECOLOGY

## 2025-02-26 PROCEDURE — 96372 THER/PROPH/DIAG INJ SC/IM: CPT | Performed by: OBSTETRICS & GYNECOLOGY

## 2025-02-26 RX ORDER — MEDROXYPROGESTERONE ACETATE 150 MG/ML
150 INJECTION, SUSPENSION INTRAMUSCULAR ONCE
Status: COMPLETED | OUTPATIENT
Start: 2025-02-26 | End: 2025-02-26

## 2025-02-26 RX ADMIN — MEDROXYPROGESTERONE ACETATE 150 MG: 150 INJECTION, SUSPENSION INTRAMUSCULAR at 12:00

## 2025-02-26 NOTE — PROGRESS NOTES
Patient alert and pleasant with no concerns  Here today for Depo-provera  Urine for pregnancy obtained with negative results  Depo-provera 150 mg IM given without difficulty    Discharge instructions have been discussed with the patient. Patient advised to call our office with any questions or concerns.   Voiced understanding.

## 2025-05-22 ENCOUNTER — OFFICE VISIT (OUTPATIENT)
Age: 50
End: 2025-05-22

## 2025-05-22 VITALS
WEIGHT: 170 LBS | SYSTOLIC BLOOD PRESSURE: 121 MMHG | OXYGEN SATURATION: 96 % | HEART RATE: 103 BPM | TEMPERATURE: 98 F | DIASTOLIC BLOOD PRESSURE: 83 MMHG | BODY MASS INDEX: 33.2 KG/M2

## 2025-05-22 DIAGNOSIS — Z91.89 AT HIGH RISK FOR BREAST CANCER: ICD-10-CM

## 2025-05-22 DIAGNOSIS — Z12.31 SCREENING MAMMOGRAM, ENCOUNTER FOR: ICD-10-CM

## 2025-05-22 DIAGNOSIS — Z01.419 ENCOUNTER FOR GYNECOLOGICAL EXAMINATION: Primary | ICD-10-CM

## 2025-05-22 DIAGNOSIS — N93.9 ABNORMAL UTERINE BLEEDING (AUB): ICD-10-CM

## 2025-05-22 LAB
CONTROL: NORMAL
PREGNANCY TEST URINE, POC: NEGATIVE

## 2025-05-22 PROCEDURE — 96372 THER/PROPH/DIAG INJ SC/IM: CPT | Performed by: OBSTETRICS & GYNECOLOGY

## 2025-05-22 PROCEDURE — 99396 PREV VISIT EST AGE 40-64: CPT | Performed by: OBSTETRICS & GYNECOLOGY

## 2025-05-22 PROCEDURE — 81025 URINE PREGNANCY TEST: CPT | Performed by: OBSTETRICS & GYNECOLOGY

## 2025-05-22 RX ORDER — MEDROXYPROGESTERONE ACETATE 150 MG/ML
150 INJECTION, SUSPENSION INTRAMUSCULAR ONCE
Status: COMPLETED | OUTPATIENT
Start: 2025-05-22 | End: 2025-05-22

## 2025-05-22 RX ADMIN — MEDROXYPROGESTERONE ACETATE 150 MG: 150 INJECTION, SUSPENSION INTRAMUSCULAR at 10:36

## 2025-05-22 SDOH — ECONOMIC STABILITY: INCOME INSECURITY: HOW HARD IS IT FOR YOU TO PAY FOR THE VERY BASICS LIKE FOOD, HOUSING, MEDICAL CARE, AND HEATING?: SOMEWHAT HARD

## 2025-05-22 SDOH — ECONOMIC STABILITY: FOOD INSECURITY: WITHIN THE PAST 12 MONTHS, THE FOOD YOU BOUGHT JUST DIDN'T LAST AND YOU DIDN'T HAVE MONEY TO GET MORE.: NEVER TRUE

## 2025-05-22 SDOH — ECONOMIC STABILITY: FOOD INSECURITY: WITHIN THE PAST 12 MONTHS, YOU WORRIED THAT YOUR FOOD WOULD RUN OUT BEFORE YOU GOT MONEY TO BUY MORE.: NEVER TRUE

## 2025-05-22 ASSESSMENT — PATIENT HEALTH QUESTIONNAIRE - PHQ9
SUM OF ALL RESPONSES TO PHQ QUESTIONS 1-9: 0
2. FEELING DOWN, DEPRESSED OR HOPELESS: NOT AT ALL
1. LITTLE INTEREST OR PLEASURE IN DOING THINGS: NOT AT ALL
SUM OF ALL RESPONSES TO PHQ QUESTIONS 1-9: 0

## 2025-05-22 NOTE — PROGRESS NOTES
[facility] language services used for today's visit  Session code [id number]  Michelle [id number]  Here today for her annual exam and depo injection  Patient has no complaints  Patient here today for her Depo-Provera injection.  Urine pregnancy test done with negative results.  VSS.  Depo-Provera 150mg IM given as ordered left hip.  Patient tolerated injection well.  Discharge instructions have been discussed with the patient. Patient advised to call our office with any questions or concerns.   Voiced understanding.

## 2025-05-22 NOTE — PROGRESS NOTES
Pt seen with .    HISTORY OF PRESENT ILLNESS:    49 y.o. female   presents for her annual exam.     No LMP recorded. Patient has had an injection.  Periods are: none, on depo  Contraception: Depo  Number of new sexual partners: 0, sexually active with   Most recent pap smear:  normal cytology, HPV negative  History of abnormal pap smears: none  Most recent mammogram:  normal, TC score 23%  History of abnormal mammogram: none  Most recent colonoscopy:  normal  Dexa scan:   HPV vaccination:   Changes to health since last visit: none  Complaints: none      Past Medical History: History reviewed. No pertinent past medical history.                                          OB History    Para Term  AB Living   5 4 2  1    SAB IAB Ectopic Molar Multiple Live Births        2      # Outcome Date GA Lbr Tito/2nd Weight Sex Type Anes PTL Lv   5 Para      CS-LTranv      4 Para 2012     CS-LTranv      3 Term            2 Term            1 AB                   Past Surgical History:   Past Surgical History:   Procedure Laterality Date     SECTION      COLONOSCOPY N/A 3/14/2023    COLONOSCOPY DIAGNOSTIC performed by Gabriel Causey MD at [facility]        Allergies: Patient has no known allergies.     Medications:   No current outpatient medications on file.     No current facility-administered medications for this visit.         Social History:   Social History     Tobacco Use    Smoking status: Never    Smokeless tobacco: Never   Substance Use Topics    Alcohol use: Never        Family History:   Family History   Problem Relation Age of Onset    Colon Cancer Mother 69    Breast Cancer Sister 48       REVIEW OF SYSTEMS:    Constitutional: negative  HEENT: negative  Breast: negative  Respiratory: negative  Cardiovascular: negative  Gastrointestinal: negative  Genitourinary:negative  Integument: negative  Neurological: negative  Endocrine:

## 2025-06-10 ENCOUNTER — HOSPITAL ENCOUNTER (OUTPATIENT)
Dept: MAMMOGRAPHY | Age: 50
Discharge: HOME | End: 2025-06-12
Attending: OBSTETRICS & GYNECOLOGY

## 2025-06-10 VITALS — BODY MASS INDEX: 33.38 KG/M2 | WEIGHT: 170 LBS | HEIGHT: 60 IN

## 2025-06-10 DIAGNOSIS — Z12.31 SCREENING MAMMOGRAM, ENCOUNTER FOR: ICD-10-CM

## 2025-06-10 PROCEDURE — 77063 BREAST TOMOSYNTHESIS BI: CPT

## 2025-08-22 ENCOUNTER — OFFICE VISIT (OUTPATIENT)
Age: 50
End: 2025-08-22

## 2025-08-22 VITALS
WEIGHT: 174.5 LBS | HEART RATE: 89 BPM | OXYGEN SATURATION: 94 % | DIASTOLIC BLOOD PRESSURE: 89 MMHG | SYSTOLIC BLOOD PRESSURE: 130 MMHG | TEMPERATURE: 97.8 F | BODY MASS INDEX: 34.08 KG/M2

## 2025-08-22 DIAGNOSIS — N93.9 ABNORMAL UTERINE BLEEDING (AUB): Primary | ICD-10-CM

## 2025-08-22 LAB
CONTROL: NORMAL
PREGNANCY TEST URINE, POC: NORMAL

## 2025-08-22 PROCEDURE — 81025 URINE PREGNANCY TEST: CPT | Performed by: STUDENT IN AN ORGANIZED HEALTH CARE EDUCATION/TRAINING PROGRAM

## 2025-08-22 PROCEDURE — 96372 THER/PROPH/DIAG INJ SC/IM: CPT | Performed by: STUDENT IN AN ORGANIZED HEALTH CARE EDUCATION/TRAINING PROGRAM

## 2025-08-22 RX ORDER — MEDROXYPROGESTERONE ACETATE 150 MG/ML
150 INJECTION, SUSPENSION INTRAMUSCULAR ONCE
Status: COMPLETED | OUTPATIENT
Start: 2025-08-22 | End: 2025-08-22

## 2025-08-22 RX ADMIN — MEDROXYPROGESTERONE ACETATE 150 MG: 150 INJECTION, SUSPENSION INTRAMUSCULAR at 10:27

## (undated) DEVICE — CONNECTOR IRRIGATION AUXILIARY H2O JET W/ PRT MTL THRD HYDR

## (undated) DEVICE — DEFENDO AIR WATER SUCTION AND BIOPSY VALVE KIT FOR  OLYMPUS: Brand: DEFENDO AIR/WATER/SUCTION AND BIOPSY VALVE

## (undated) DEVICE — GAUZE,SPONGE,4"X4",8PLY,STRL,LF,10/TRAY: Brand: MEDLINE

## (undated) DEVICE — Z DISCONTINUED NO SUB IDED TUBING ETCO2 AD L6.5FT NSL ORAL CVD PRNG NONFLARED TIP OVR